# Patient Record
Sex: MALE | Race: WHITE | NOT HISPANIC OR LATINO | Employment: FULL TIME | URBAN - NONMETROPOLITAN AREA
[De-identification: names, ages, dates, MRNs, and addresses within clinical notes are randomized per-mention and may not be internally consistent; named-entity substitution may affect disease eponyms.]

---

## 2020-03-13 ENCOUNTER — APPOINTMENT (OUTPATIENT)
Dept: GENERAL RADIOLOGY | Facility: HOSPITAL | Age: 25
End: 2020-03-13

## 2020-03-13 ENCOUNTER — APPOINTMENT (OUTPATIENT)
Dept: CT IMAGING | Facility: HOSPITAL | Age: 25
End: 2020-03-13

## 2020-03-13 ENCOUNTER — HOSPITAL ENCOUNTER (EMERGENCY)
Facility: HOSPITAL | Age: 25
Discharge: HOME | End: 2020-03-13
Admitting: EMERGENCY MEDICINE

## 2020-03-13 VITALS
RESPIRATION RATE: 20 BRPM | HEART RATE: 90 BPM | TEMPERATURE: 98 F | DIASTOLIC BLOOD PRESSURE: 92 MMHG | HEIGHT: 74 IN | OXYGEN SATURATION: 97 % | WEIGHT: 315 LBS | SYSTOLIC BLOOD PRESSURE: 153 MMHG | BODY MASS INDEX: 40.43 KG/M2

## 2020-03-13 DIAGNOSIS — S62.112A CLOSED CHIP FRACTURE OF TRIQUETRUM OF LEFT WRIST, INITIAL ENCOUNTER: ICD-10-CM

## 2020-03-13 DIAGNOSIS — H10.9 CONJUNCTIVITIS OF RIGHT EYE, UNSPECIFIED CONJUNCTIVITIS TYPE: ICD-10-CM

## 2020-03-13 DIAGNOSIS — S52.502A CLOSED FRACTURE OF DISTAL END OF LEFT RADIUS, UNSPECIFIED FRACTURE MORPHOLOGY, INITIAL ENCOUNTER: ICD-10-CM
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
## 2020-03-13 PROCEDURE — 25010000002 ONDANSETRON PER 1 MG: Performed by: NURSE PRACTITIONER

## 2020-03-13 PROCEDURE — 72125 CT NECK SPINE W/O DYE: CPT

## 2020-03-13 PROCEDURE — 96372 THER/PROPH/DIAG INJ SC/IM: CPT

## 2020-03-13 PROCEDURE — 73090 X-RAY EXAM OF FOREARM: CPT

## 2020-03-13 PROCEDURE — 73110 X-RAY EXAM OF WRIST: CPT

## 2020-03-13 PROCEDURE — 25010000003 HYDROMORPHONE 1 MG/ML SOLUTION: Performed by: NURSE PRACTITIONER

## 2020-03-13 PROCEDURE — 99283 EMERGENCY DEPT VISIT LOW MDM: CPT

## 2020-03-13 PROCEDURE — 70450 CT HEAD/BRAIN W/O DYE: CPT

## 2020-03-13 PROCEDURE — 72131 CT LUMBAR SPINE W/O DYE: CPT

## 2020-03-13 RX ORDER — ONDANSETRON 2 MG/ML
4 INJECTION INTRAMUSCULAR; INTRAVENOUS ONCE
Status: COMPLETED | OUTPATIENT
Start: 2020-03-13 | End: 2020-03-13

## 2020-03-13 RX ORDER — HYDROCODONE BITARTRATE AND ACETAMINOPHEN 10; 325 MG/1; MG/1
1 TABLET ORAL EVERY 6 HOURS PRN
Qty: 12 TABLET | Refills: 0 | Status: SHIPPED | OUTPATIENT
Start: 2020-03-13 | End: 2020-03-16

## 2020-03-13 RX ORDER — NAPROXEN 500 MG/1
500 TABLET ORAL 2 TIMES DAILY WITH MEALS
Qty: 20 TABLET | Refills: 0 | Status: SHIPPED | OUTPATIENT
Start: 2020-03-13 | End: 2020-03-23

## 2020-03-13 RX ADMIN — ONDANSETRON HYDROCHLORIDE 4 MG: 2 SOLUTION INTRAMUSCULAR; INTRAVENOUS at 09:55

## 2020-03-13 RX ADMIN — HYDROMORPHONE HYDROCHLORIDE 1 MG: 1 INJECTION, SOLUTION INTRAMUSCULAR; INTRAVENOUS; SUBCUTANEOUS at 09:55

## 2020-03-13 NOTE — ED PROVIDER NOTES
Subjective   25 yom c/o fall.  He states he slipped missing a step and fell [id number] steps.  He is unsure of LOC.  He is c/o headache, neck pain and LFA pain.  He is alert and oriented x 3. No paralysis or incontinence. No tenderness to the spine.  He has mild swelling to the LFA.  He has a large hematoma to the L forehead.  He also has right eye redness, matting and yellow drainage. C-collar placed.          Review of Systems   Constitutional: Negative for activity change, appetite change, fatigue and fever.   HENT: Negative for congestion, ear pain, facial swelling and sore throat.    Eyes: Negative for discharge and visual disturbance.   Respiratory: Negative for apnea, chest tightness, shortness of breath, wheezing and stridor.    Cardiovascular: Negative for chest pain and palpitations.   Gastrointestinal: Negative for abdominal distention, abdominal pain, diarrhea, nausea and vomiting.   Genitourinary: Negative for difficulty urinating and dysuria.   Musculoskeletal: Positive for back pain and neck pain. Negative for arthralgias and myalgias.   Skin: Negative for rash and wound.   Neurological: Negative for dizziness and seizures.   Psychiatric/Behavioral: Negative for agitation and confusion.       History reviewed. No pertinent past medical history.    No Known Allergies    History reviewed. No pertinent surgical history.    No family history on file.    Social History     Socioeconomic History   • Marital status: Single     Spouse name: Not on file   • Number of children: Not on file   • Years of education: Not on file   • Highest education level: Not on file           Objective   Physical Exam   Constitutional: He is oriented to person, place, and time. He appears well-developed.   HENT:   Head: Normocephalic. Head is without raccoon's eyes and without Woodard's sign.       Large hematoma present to the left forehead   Eyes: Pupils are equal, round, and reactive to light. EOM are normal. Right eye exhibits  discharge.   Right conjunctival redness present   Neck: Normal range of motion. Neck supple.   Cardiovascular: Normal rate and regular rhythm.   No murmur heard.  Pulmonary/Chest: Effort normal and breath sounds normal.   Abdominal: Soft. Bowel sounds are normal.   Musculoskeletal: Normal range of motion.   Neurological: He is alert and oriented to person, place, and time.   Skin: Skin is warm and dry.   Psychiatric: He has a normal mood and affect.   Nursing note and vitals reviewed.      Splint - Cast - Strapping  Date/Time: 3/13/2020 10:30 AM  Performed by: Chris Aguirre APRN  Authorized by: Chris Aguirre APRN     Consent:     Consent obtained:  Verbal    Consent given by:  Patient    Risks discussed:  Discoloration, numbness, pain and swelling    Alternatives discussed:  No treatment  Pre-procedure details:     Sensation:  Normal    Skin color:  Pink  Procedure details:     Laterality:  Left    Location:  Wrist    Wrist:  L wrist    Splint type:  Sugar tong    Supplies:  Ortho-Glass  Post-procedure details:     Pain:  Improved    Sensation:  Normal    Skin color:  Pink    Patient tolerance of procedure:  Tolerated well, no immediate complications  Comments:      Placed per staff               ED Course  ED Course as of Mar 14 0805   Fri Mar 13, 2020   1025 I discussed the patient's testing results with him. He voiced understanding of results and discharge instructions.    [KS]   [address] [id number] reviewed and in order    [KS]      ED Course User Index  [KS] Chris Aguirre APRN                   No current facility-administered medications for this encounter.     Current Outpatient Medications:   •  HYDROcodone-acetaminophen (NORCO)  MG per tablet, Take 1 tablet by mouth Every 6 (Six) Hours As Needed for Moderate Pain  for up to 3 days., Disp: 12 tablet, Rfl: 0  •  naproxen (NAPROSYN) 500 MG tablet, Take 1 tablet by mouth 2 (Two) Times a Day With Meals for 10 days.,  "Disp: 20 tablet, Rfl: 0  •  tobramycin in sterile water (preservative free) injection-balanced salts ophthalmic solution, Apply 1-2 drops to eye(s) as directed by provider Every 4 (Four) Hours., Disp: 5 mL, Rfl: 0    Vital signs:  /92 (BP Location: Right arm, Patient Position: Sitting)   Pulse 90   Temp 98 °F (36.7 °C)   Resp 20   Ht 188 cm (74\")   Wt (!) 145 kg (320 lb)   SpO2 97%   BMI 41.09 kg/m²        ED LAB RESULTS:   Lab Results (last 24 hours)     ** No results found for the last 24 hours. **             IMAGING RESULTS  CT Head Without Contrast   ED Interpretation   See results below      Final Result   1. No intracranial hemorrhage, edema or mass effect.   2. Soft tissue hematoma over the left forehead.       The full report of this exam was immediately signed and available to the   emergency room. The patient is currently in the emergency room.       This report was finalized on 03/13/2020 09:22 by Dr. Blayne Ball MD.      CT Cervical Spine Without Contrast   ED Interpretation   See results below      Final Result   1.  No acute osseous posttraumatic findings.   This report was finalized on 03/13/2020 09:31 by Dr. Allyssa Prather MD.      CT Lumbar Spine Without Contrast   ED Interpretation   See results below      Final Result   1.  No acute osseous posttraumatic findings.           This report was finalized on 03/13/2020 09:33 by Dr. Allyssa Prather MD.      XR Wrist 3+ View Left   ED Interpretation   See results below      Final Result   1. Nondisplaced fracture of the mid to distal shaft of the radius.   2. Nondisplaced triquetral fracture.   3. Ulna minus variant. Corticated bone fragment adjacent to the distal   ulna is likely an old injury or unfused growth center.       The full report of this exam was immediately signed and available to the   emergency room. The patient is currently in the emergency room.       This report was finalized on 03/13/2020 08:54 by Dr. Blayne Ball, " MD.      XR Forearm 2 View Left   ED Interpretation   See results below      Final Result   1. Nondisplaced fracture of the distal shaft of the radius.   2. Nondisplaced fracture of the triquetrum seen better on the wrist   series.   3. Ulna minus variant. Corticated bone fragment adjacent to the ulnar   styloid process likely an old injury or unfused growth center.   This report was finalized on 03/13/2020 08:56 by Dr. Blayne Ball MD.                                              MDM  Number of Diagnoses or Management Options  Closed chip fracture of triquetrum of left wrist, initial encounter: minor  Closed fracture of distal end of left radius, unspecified fracture morphology, initial encounter: minor  Conjunctivitis of right eye, unspecified conjunctivitis type: minor  Fall, initial encounter: minor     Amount and/or Complexity of Data Reviewed  Tests in the radiology section of CPT®: ordered and reviewed  Discuss the patient with other providers: yes  Independent visualization of images, tracings, or specimens: yes    Patient Progress  Patient progress: improved      Final diagnoses:   Fall, initial encounter   Closed fracture of distal end of left radius, unspecified fracture morphology, initial encounter   Closed chip fracture of triquetrum of left wrist, initial encounter   Conjunctivitis of right eye, unspecified conjunctivitis type            Shoulders, Chris Caputo, APRN  03/14/20 0884

## 2020-03-13 NOTE — DISCHARGE INSTRUCTIONS
Increase fluids. Medication as ordered.  Can add tylenol as needed for pain/fever.  Splint in place until seen by orthopedics.  Ice to affected areas.  Follow up with PCP and orthopedics (Cyril on call) Monday - call for appointment. Return to ED if condition does not improve or worsens

## 2023-02-22 ENCOUNTER — PRIOR AUTHORIZATION (OUTPATIENT)
Dept: INTERNAL MEDICINE | Facility: CLINIC | Age: 28
End: 2023-02-22
Payer: COMMERCIAL

## 2023-02-22 ENCOUNTER — OFFICE VISIT (OUTPATIENT)
Dept: INTERNAL MEDICINE | Facility: CLINIC | Age: 28
End: 2023-02-22
Payer: COMMERCIAL

## 2023-02-22 VITALS
TEMPERATURE: 97.8 F | HEIGHT: 74 IN | OXYGEN SATURATION: 100 % | SYSTOLIC BLOOD PRESSURE: 144 MMHG | BODY MASS INDEX: 39.4 KG/M2 | HEART RATE: 94 BPM | WEIGHT: 307 LBS | DIASTOLIC BLOOD PRESSURE: 90 MMHG

## 2023-02-22 DIAGNOSIS — E11.65 TYPE 2 DIABETES MELLITUS WITH HYPERGLYCEMIA, WITHOUT LONG-TERM CURRENT USE OF INSULIN: Primary | ICD-10-CM

## 2023-02-22 DIAGNOSIS — Z00.00 HEALTH CARE MAINTENANCE: ICD-10-CM

## 2023-02-22 PROBLEM — F90.9 ADULT ATTENTION DEFICIT HYPERACTIVITY DISORDER: Status: ACTIVE | Noted: 2023-02-22

## 2023-02-22 LAB
EXPIRATION DATE: ABNORMAL
HBA1C MFR BLD: 10 %
Lab: ABNORMAL

## 2023-02-22 PROCEDURE — 99204 OFFICE O/P NEW MOD 45 MIN: CPT | Performed by: INTERNAL MEDICINE

## 2023-02-22 PROCEDURE — 83036 HEMOGLOBIN GLYCOSYLATED A1C: CPT | Performed by: INTERNAL MEDICINE

## 2023-02-22 NOTE — TELEPHONE ENCOUNTER
Ozempic (1 MG/DOSE) 4MG/3ML pen-injectors    (Key: [id number])  Rx #: [id number]    Message from Plan  This request has been approved using information available on the patient's profile. CaseId:[id number];Status:Approved;Review Type:Prior Auth;Coverage Start Date:01/23/2023;Coverage End Date:02/22/2024;

## 2023-02-22 NOTE — PROGRESS NOTES
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 Uncertain of his glucose numbers\"  This was about a year ago.   He dose not check his glucose on a regular basis.     He states that he stopped Metformin because it wasn't doing anything.     No HX of thyroid tumor or pancreatitis.     Patient's PMR from outside medical facility reviewed and noted.    Review of Systems   Constitutional: Negative for chills and fever.   HENT: Negative for congestion and rhinorrhea.    Respiratory: Negative for cough and shortness of breath.    Cardiovascular: Negative for chest pain and leg swelling.   Gastrointestinal: Negative for constipation and diarrhea.   Genitourinary: Negative for dysuria and hematuria.      Otherwise complete ROS reviewed and negative except as mentioned in the HPI.    Past Medical History:   Past Medical History:   Diagnosis Date   • Diabetes mellitus (HCC)      Past Surgical History:  Past Surgical History:   Procedure Laterality Date   • FRACTURE SURGERY       Social History:  reports that he has never smoked. He has never used smokeless tobacco. He reports that he does not drink alcohol and does not use drugs.    Family History: family history includes Cancer in his brother and maternal grandfather; Diabetes in his father.       Allergies:  No Known Allergies  Medications:  Prior to Admission medications    Medication Sig Start Date End Date Taking? Authorizing Provider   tobramycin in sterile water (preservative free) injection-balanced salts ophthalmic solution Apply 1-2 drops to eye(s) as directed by provider Every 4 (Four) Hours. 3/13/20 2/22/23  Shoulders, Kristee Croft, APRN       Objective     Vital Signs: /90 (BP Location: Left arm, Patient Position: Sitting, Cuff Size: Large Adult)   Pulse 94   Temp 97.8 °F (36.6 °C) (Infrared)   Ht 188 cm (74\")   Wt (!) 139 kg (307 lb)   SpO2 " 100%   BMI 39.42 kg/m²   Physical Exam  Vitals reviewed.   Constitutional:       Appearance: He is obese.   HENT:      Head: Normocephalic and atraumatic.      Right Ear: External ear normal.      Left Ear: External ear normal.      Nose: Nose normal.   Eyes:      General: No scleral icterus.     Conjunctiva/sclera: Conjunctivae normal.   Cardiovascular:      Rate and Rhythm: Normal rate and regular rhythm.      Heart sounds: Normal heart sounds.   Pulmonary:      Effort: Pulmonary effort is normal.      Breath sounds: Normal breath sounds.   Musculoskeletal:         General: No swelling or tenderness.      Cervical back: Normal range of motion and neck supple.   Skin:     General: Skin is warm and dry.   Neurological:      General: No focal deficit present.      Mental Status: He is alert.      Cranial Nerves: No cranial nerve deficit.   Psychiatric:         Mood and Affect: Mood normal.         Behavior: Behavior normal.         Class 2 Severe Obesity (BMI >=35 and <=39.9). Obesity-related health conditions include the following: diabetes mellitus. Obesity is newly identified. BMI is is above average; BMI management plan is completed. We discussed portion control and increasing exercise.      Results Reviewed:  No results found for: GLUCOSE, BUN, CREATININE, NA, K, CL, CO2, CALCIUM, ALT, AST, WBC, HCT, PLT, CHOL, TRIG, HDL, LDL, LDLHDL, HGBA1C    Assessment / Plan     Assessment/Plan:  1. Type 2 diabetes mellitus with hyperglycemia, without long-term current use of insulin ([facility])  - POC Glycosylated Hemoglobin (Hb A1C)  - Semaglutide, 1 MG/DOSE, 4 MG/3ML solution pen-injector; Inject 1 mg under the skin into the appropriate area as directed 1 (One) Time Per Week.  Dispense: 3 mL; Refill: 1    2. Health care maintenance  - CBC w AUTO Differential  - Comprehensive metabolic panel  - Lipid panel  - Testosterone  - T4  - TSH        Return in about 4 weeks (around 3/22/2023) for Recheck, Next scheduled follow up.  unless patient needs to be seen sooner or acute issues arise.    Code Status: Full    I have discussed the patient results/orders and and plan/recommendation with them at today's visit.      Neil Krueger, DO   02/22/2023

## 2023-02-23 ENCOUNTER — PATIENT ROUNDING (BHMG ONLY) (OUTPATIENT)
Dept: INTERNAL MEDICINE | Facility: CLINIC | Age: 28
End: 2023-02-23
Payer: COMMERCIAL

## 2023-02-23 LAB
ALBUMIN SERPL-MCNC: 4.7 G/DL (ref 4.1–5.2)
ALBUMIN/GLOB SERPL: 1.7 {RATIO} (ref 1.2–2.2)
ALP SERPL-CCNC: 82 IU/L (ref 44–121)
ALT SERPL-CCNC: 19 IU/L (ref 0–44)
AST SERPL-CCNC: 16 IU/L (ref 0–40)
BASOPHILS # BLD AUTO: 0 X10E3/UL (ref 0–0.2)
BASOPHILS NFR BLD AUTO: 1 %
BILIRUB SERPL-MCNC: 0.5 MG/DL (ref 0–1.2)
BUN SERPL-MCNC: 10 MG/DL (ref 6–20)
BUN/CREAT SERPL: 14 (ref 9–20)
CALCIUM SERPL-MCNC: 9.4 MG/DL (ref 8.7–10.2)
CHLORIDE SERPL-SCNC: 96 MMOL/L (ref 96–106)
CHOLEST SERPL-MCNC: 199 MG/DL (ref 100–199)
CO2 SERPL-SCNC: 21 MMOL/L (ref 20–29)
CREAT SERPL-MCNC: 0.74 MG/DL (ref 0.76–1.27)
EGFRCR SERPLBLD CKD-EPI 2021: 127 ML/MIN/1.73
EOSINOPHIL # BLD AUTO: 0.1 X10E3/UL (ref 0–0.4)
EOSINOPHIL NFR BLD AUTO: 1 %
ERYTHROCYTE [DISTWIDTH] IN BLOOD BY AUTOMATED COUNT: 12.9 % (ref 11.6–15.4)
GLOBULIN SER CALC-MCNC: 2.7 G/DL (ref 1.5–4.5)
GLUCOSE SERPL-MCNC: 265 MG/DL (ref 70–99)
HCT VFR BLD AUTO: 49.3 % (ref 37.5–51)
HDLC SERPL-MCNC: 29 MG/DL
HGB BLD-MCNC: 16.6 G/DL (ref 13–17.7)
IMM GRANULOCYTES # BLD AUTO: 0.1 X10E3/UL (ref 0–0.1)
IMM GRANULOCYTES NFR BLD AUTO: 1 %
LDLC SERPL CALC-MCNC: 54 MG/DL (ref 0–99)
LYMPHOCYTES # BLD AUTO: 1.9 X10E3/UL (ref 0.7–3.1)
LYMPHOCYTES NFR BLD AUTO: 27 %
MCH RBC QN AUTO: 30.2 PG (ref 26.6–33)
MCHC RBC AUTO-ENTMCNC: 33.7 G/DL (ref 31.5–35.7)
MCV RBC AUTO: 90 FL (ref 79–97)
MONOCYTES # BLD AUTO: 0.4 X10E3/UL (ref 0.1–0.9)
MONOCYTES NFR BLD AUTO: 6 %
NEUTROPHILS # BLD AUTO: 4.7 X10E3/UL (ref 1.4–7)
NEUTROPHILS NFR BLD AUTO: 64 %
PLATELET # BLD AUTO: 225 X10E3/UL (ref 150–450)
POTASSIUM SERPL-SCNC: 4.7 MMOL/L (ref 3.5–5.2)
PROT SERPL-MCNC: 7.4 G/DL (ref 6–8.5)
RBC # BLD AUTO: 5.5 X10E6/UL (ref 4.14–5.8)
SODIUM SERPL-SCNC: 134 MMOL/L (ref 134–144)
T4 SERPL-MCNC: 8.9 UG/DL (ref 4.5–12)
TESTOST SERPL-MCNC: 209 NG/DL (ref 264–916)
TRIGL SERPL-MCNC: 790 MG/DL (ref 0–149)
TSH SERPL DL<=0.005 MIU/L-ACNC: 2.01 UIU/ML (ref 0.45–4.5)
VLDLC SERPL CALC-MCNC: 116 MG/DL (ref 5–40)
WBC # BLD AUTO: 7.1 X10E3/UL (ref 3.4–10.8)

## 2023-02-23 NOTE — PROGRESS NOTES
February 23, 2023    Hello, may I speak with Nick Aguirre?    My name is HOLLY      I am  with [facility]  [address] [place] [address] [phone].    Before we get started may I verify your date of birth? 1995    I am calling to officially welcome you to our practice and ask about your recent visit. Is this a good time to talk? yes    Tell me about your visit with us. What things went well?  PT stated he really liked Dr Krueger, he was very pleased!  She cared in what she was saying.       We're always looking for ways to make our patients' experiences even better. Do you have recommendations on ways we may improve?  no    Overall were you satisfied with your first visit to our practice? yes       I appreciate you taking the time to speak with me today. Is there anything else I can do for you? no      Thank you, and have a great day.

## 2023-03-06 DIAGNOSIS — E78.1 HYPERTRIGLYCERIDEMIA: Primary | ICD-10-CM

## 2023-03-08 ENCOUNTER — LAB (OUTPATIENT)
Dept: INTERNAL MEDICINE | Facility: CLINIC | Age: 28
End: 2023-03-08
Payer: COMMERCIAL

## 2023-03-20 ENCOUNTER — OFFICE VISIT (OUTPATIENT)
Dept: INTERNAL MEDICINE | Facility: CLINIC | Age: 28
End: 2023-03-20
Payer: COMMERCIAL

## 2023-03-20 VITALS
SYSTOLIC BLOOD PRESSURE: 147 MMHG | TEMPERATURE: 98.2 F | HEIGHT: 74 IN | OXYGEN SATURATION: 99 % | WEIGHT: 307 LBS | HEART RATE: 85 BPM | DIASTOLIC BLOOD PRESSURE: 92 MMHG | BODY MASS INDEX: 39.4 KG/M2

## 2023-03-20 DIAGNOSIS — I10 PRIMARY HYPERTENSION: ICD-10-CM

## 2023-03-20 DIAGNOSIS — R79.89 LOW TESTOSTERONE: ICD-10-CM

## 2023-03-20 DIAGNOSIS — E78.5 DYSLIPIDEMIA: Primary | ICD-10-CM

## 2023-03-20 PROCEDURE — 99214 OFFICE O/P EST MOD 30 MIN: CPT | Performed by: INTERNAL MEDICINE

## 2023-03-20 RX ORDER — LISINOPRIL 10 MG/1
10 TABLET ORAL DAILY
Qty: 90 TABLET | Refills: 1 | Status: SHIPPED | OUTPATIENT
Start: 2023-03-20

## 2023-03-20 RX ORDER — FENOFIBRATE 145 MG/1
145 TABLET, COATED ORAL DAILY
Qty: 90 TABLET | Refills: 1 | Status: SHIPPED | OUTPATIENT
Start: 2023-03-20

## 2023-03-20 RX ORDER — TESTOSTERONE CYPIONATE 200 MG/ML
200 INJECTION, SOLUTION INTRAMUSCULAR WEEKLY
Qty: 4 ML | Refills: 2 | Status: SHIPPED | OUTPATIENT
Start: 2023-03-20

## 2023-03-20 NOTE — PROGRESS NOTES
Subjective     Chief Complaint   Patient presents with   • Diabetes     4 wk fu         History of Present Illness  Patient presents for 1 month follow up after starting Ozempic. Patient is up to 0.5mg starting last night. Patient does not monitor glucose regularly states last time he checked was 2-3 months ago and his non fasting was ~270. Patient states that he is physically active at work daily and that he has been trying to cut out sugar, specifically soft drinks and sweet tea. Patient states he will occasionally drink a Pibb but other than that he has been staying away from sweetened drinks. Patient states he will put sweet n low in unsweetened tea. Patient states he has been having increased depressed mood which he thinks is due to relationship issues within the past year.    Labs reviewed. Patient willing to take medications.     Patient's PMR from outside medical facility reviewed and noted.    Review of Systems   Constitutional: Negative for chills, fatigue and fever.   HENT: Positive for congestion (seasonal), rhinorrhea and sinus pressure.    Eyes: Positive for redness.   Respiratory: Negative for chest tightness and shortness of breath.    Cardiovascular: Negative for chest pain and leg swelling.   Gastrointestinal: Negative for abdominal pain, nausea and vomiting.   Genitourinary: Negative for difficulty urinating and dysuria.   Musculoskeletal: Negative for back pain and neck pain.   Skin: Negative for rash.   Neurological: Negative for dizziness and light-headedness.   Psychiatric/Behavioral: Negative for behavioral problems and suicidal ideas.      Otherwise complete ROS reviewed and negative except as mentioned in the HPI.    Past Medical History:   Past Medical History:   Diagnosis Date   • Diabetes mellitus (HCC)      Past Surgical History:  Past Surgical History:   Procedure Laterality Date   • FRACTURE SURGERY       Social History:  reports that he has never smoked. He has never used  "smokeless tobacco. He reports that he does not drink alcohol and does not use drugs.    Family History: family history includes Cancer in his brother and maternal grandfather; Diabetes in his father.      Allergies:  No Known Allergies  Medications:  Prior to Admission medications    Medication Sig Start Date End Date Taking? Authorizing Provider   Semaglutide, 1 MG/DOSE, 4 MG/3ML solution pen-injector Inject 1 mg under the skin into the appropriate area as directed 1 (One) Time Per Week. 2/22/23  Yes Neil Krueger,        Objective     Vital Signs: /92 (BP Location: Left arm, Patient Position: Sitting, Cuff Size: Large Adult)   Pulse 85   Temp 98.2 °F (36.8 °C) (Infrared)   Ht 188 cm (74\")   Wt (!) 139 kg (307 lb)   SpO2 99%   BMI 39.42 kg/m²   Physical Exam  Vitals reviewed.   Constitutional:       Appearance: Normal appearance.   HENT:      Head: Normocephalic and atraumatic.      Right Ear: External ear normal.      Left Ear: External ear normal.      Nose: Nose normal.   Eyes:      General: No scleral icterus.     Conjunctiva/sclera: Conjunctivae normal.   Cardiovascular:      Rate and Rhythm: Normal rate and regular rhythm.      Heart sounds: Normal heart sounds.   Pulmonary:      Effort: Pulmonary effort is normal.      Breath sounds: Normal breath sounds.   Musculoskeletal:         General: No swelling or tenderness.      Cervical back: Normal range of motion and neck supple.   Skin:     General: Skin is warm and dry.   Neurological:      Mental Status: He is alert and oriented to person, place, and time.      Cranial Nerves: No cranial nerve deficit.   Psychiatric:         Mood and Affect: Mood normal.         Behavior: Behavior normal.       Class 2 Severe Obesity (BMI >=35 and <=39.9). Obesity-related health conditions include the following: hypertension. Obesity is unchanged. BMI is is above average; BMI management plan is completed. We discussed portion control and increasing " exercise.      Results Reviewed:  Glucose   Date Value Ref Range Status   02/22/2023 265 (H) 70 - 99 mg/dL Final     BUN   Date Value Ref Range Status   02/22/2023 10 6 - 20 mg/dL Final     Creatinine   Date Value Ref Range Status   02/22/2023 0.74 (L) 0.76 - 1.27 mg/dL Final     Sodium   Date Value Ref Range Status   02/22/2023 134 134 - 144 mmol/L Final     Potassium   Date Value Ref Range Status   02/22/2023 4.7 3.5 - 5.2 mmol/L Final     Chloride   Date Value Ref Range Status   02/22/2023 96 96 - 106 mmol/L Final     Total CO2   Date Value Ref Range Status   02/22/2023 21 20 - 29 mmol/L Final     Calcium   Date Value Ref Range Status   02/22/2023 9.4 8.7 - 10.2 mg/dL Final     ALT (SGPT)   Date Value Ref Range Status   02/22/2023 19 0 - 44 IU/L Final     AST (SGOT)   Date Value Ref Range Status   02/22/2023 16 0 - 40 IU/L Final     WBC   Date Value Ref Range Status   02/22/2023 7.1 3.4 - 10.8 x10E3/uL Final     Hematocrit   Date Value Ref Range Status   02/22/2023 49.3 37.5 - 51.0 % Final     Platelets   Date Value Ref Range Status   02/22/2023 225 150 - 450 x10E3/uL Final     Triglycerides   Date Value Ref Range Status   03/08/2023 556 (H) 0 - 149 mg/dL Final     HDL Cholesterol   Date Value Ref Range Status   03/08/2023 31 (L) >39 mg/dL Final     LDL Chol Calc (NIH)   Date Value Ref Range Status   03/08/2023 58 0 - 99 mg/dL Final     Hemoglobin A1C   Date Value Ref Range Status   02/22/2023 10.0 % Final         Assessment / Plan     Assessment/Plan:  1. Dyslipidemia  - fenofibrate (Tricor) 145 MG tablet; Take 1 tablet by mouth Daily.  Dispense: 90 tablet; Refill: 1    2. Low testosterone  - Testosterone Cypionate (Depo-Testosterone) 200 MG/ML injection; Inject 1 mL into the appropriate muscle as directed by prescriber 1 (One) Time Per Week.  Dispense: 4 mL; Refill: 2    3. Primary hypertension  - lisinopril (PRINIVIL,ZESTRIL) 10 MG tablet; Take 1 tablet by mouth Daily.  Dispense: 90 tablet; Refill:  1        Return in about 3 months (around 6/20/2023) for Recheck, Next scheduled follow up. unless patient needs to be seen sooner or acute issues arise.    Code Status: Full    I have discussed the patient results/orders and and plan/recommendation with them at today's visit.      Neil Krueger, DO   03/20/2023

## 2023-04-25 DIAGNOSIS — E11.65 TYPE 2 DIABETES MELLITUS WITH HYPERGLYCEMIA, WITHOUT LONG-TERM CURRENT USE OF INSULIN: ICD-10-CM

## 2023-04-25 NOTE — TELEPHONE ENCOUNTER
Rx Refill Note  Requested Prescriptions     Pending Prescriptions Disp Refills   • Semaglutide, 1 MG/DOSE, (OZEMPIC) 4 MG/3ML solution pen-injector 3 mL 1     Sig: Inject 1 mg under the skin into the appropriate area as directed 1 (One) Time Per Week.      Last office visit with prescribing clinician: 3/20/2023   Last telemedicine visit with prescribing clinician: 7/10/2023   Next office visit with prescribing clinician: 7/10/2023                         Would you like a call back once the refill request has been completed: [] Yes [] No    If the office needs to give you a call back, can they leave a voicemail: [] Yes [] No    Kecia Degroot MA  04/25/23, 16:10 CDT

## 2023-04-25 NOTE — TELEPHONE ENCOUNTER
"  Caller: Nick Aguirre \"WILL\"    Relationship: Self    Best call back number: [phone]  Requested Prescriptions:   Requested Prescriptions     Pending Prescriptions Disp Refills   • Semaglutide, 1 MG/DOSE, (OZEMPIC) 4 MG/3ML solution pen-injector 3 mL 1     Sig: Inject 1 mg under the skin into the appropriate area as directed 1 (One) Time Per Week.        Pharmacy where request should be sent: [facility] #[id number] - [place], [place] - [address] [phone] [facility] [phone]      Last office visit with prescribing clinician: 3/20/2023   Last telemedicine visit with prescribing clinician: 7/10/2023   Next office visit with prescribing clinician: 7/10/2023         Does the patient have less than a 3 day supply:  [x] Yes  [] No    Would you like a call back once the refill request has been completed: [] Yes [x] No    I    Omkar Martinez Rep   04/25/23 16:10 CDT             "

## 2023-06-16 DIAGNOSIS — E11.65 TYPE 2 DIABETES MELLITUS WITH HYPERGLYCEMIA, WITHOUT LONG-TERM CURRENT USE OF INSULIN: ICD-10-CM

## 2023-06-16 NOTE — TELEPHONE ENCOUNTER
Rx Refill Note  Requested Prescriptions     Pending Prescriptions Disp Refills    Semaglutide, 1 MG/DOSE, (OZEMPIC) 4 MG/3ML solution pen-injector 3 mL 1     Sig: Inject 1 mg under the skin into the appropriate area as directed 1 (One) Time Per Week.      Last office visit with prescribing clinician: 3/20/2023   Last telemedicine visit with prescribing clinician: Visit date not found   Next office visit with prescribing clinician: Visit date not found                         Would you like a call back once the refill request has been completed: [] Yes [] No    If the office needs to give you a call back, can they leave a voicemail: [] Yes [] No    Kecia Degroot MA  06/16/23, 15:43 CDT

## 2023-08-21 DIAGNOSIS — E11.65 TYPE 2 DIABETES MELLITUS WITH HYPERGLYCEMIA, WITHOUT LONG-TERM CURRENT USE OF INSULIN: ICD-10-CM

## 2023-10-29 DIAGNOSIS — E78.5 DYSLIPIDEMIA: ICD-10-CM

## 2023-10-29 DIAGNOSIS — E11.65 TYPE 2 DIABETES MELLITUS WITH HYPERGLYCEMIA, WITHOUT LONG-TERM CURRENT USE OF INSULIN: ICD-10-CM

## 2023-10-29 DIAGNOSIS — I10 PRIMARY HYPERTENSION: ICD-10-CM

## 2023-10-30 RX ORDER — LISINOPRIL 20 MG/1
20 TABLET ORAL DAILY
Qty: 90 TABLET | Refills: 1 | Status: SHIPPED | OUTPATIENT
Start: 2023-10-30

## 2023-10-30 NOTE — TELEPHONE ENCOUNTER
Rx Refill Note  Requested Prescriptions     Pending Prescriptions Disp Refills    fenofibrate (Tricor) 145 MG tablet 90 tablet 1     Sig: Take 1 tablet by mouth Daily.    Semaglutide, 1 MG/DOSE, (OZEMPIC) 4 MG/3ML solution pen-injector 3 mL 1     Sig: Inject 1 mg under the skin into the appropriate area as directed 1 (One) Time Per Week.      Last office visit with prescribing clinician: 7/10/2023  Next office visit with prescribing clinician: 10/29/2023                         Would you like a call back once the refill request has been completed: [] Yes [] No    If the office needs to give you a call back, can they leave a voicemail: [] Yes [] No    Lisa Jiang RN  10/30/23, 09:48 CDT

## 2023-10-31 RX ORDER — FENOFIBRATE 145 MG/1
145 TABLET, COATED ORAL DAILY
Qty: 90 TABLET | Refills: 1 | Status: SHIPPED | OUTPATIENT
Start: 2023-10-31

## 2023-11-30 DIAGNOSIS — E11.65 TYPE 2 DIABETES MELLITUS WITH HYPERGLYCEMIA, WITHOUT LONG-TERM CURRENT USE OF INSULIN: ICD-10-CM

## 2023-11-30 DIAGNOSIS — E78.5 DYSLIPIDEMIA: ICD-10-CM

## 2023-11-30 DIAGNOSIS — I10 PRIMARY HYPERTENSION: ICD-10-CM

## 2023-12-01 RX ORDER — LISINOPRIL 20 MG/1
20 TABLET ORAL DAILY
Qty: 90 TABLET | Refills: 1 | Status: SHIPPED | OUTPATIENT
Start: 2023-12-01

## 2023-12-01 RX ORDER — FENOFIBRATE 145 MG/1
145 TABLET, COATED ORAL DAILY
Qty: 90 TABLET | Refills: 1 | Status: SHIPPED | OUTPATIENT
Start: 2023-12-01

## 2023-12-01 NOTE — TELEPHONE ENCOUNTER
Rx Refill Note  Requested Prescriptions     Pending Prescriptions Disp Refills    fenofibrate (Tricor) 145 MG tablet 90 tablet 1     Sig: Take 1 tablet by mouth Daily.    Semaglutide, 1 MG/DOSE, (OZEMPIC) 4 MG/3ML solution pen-injector 3 mL 1     Sig: Inject 1 mg under the skin into the appropriate area as directed 1 (One) Time Per Week.    lisinopril (PRINIVIL,ZESTRIL) 20 MG tablet 90 tablet 1     Sig: Take 1 tablet by mouth Daily.      Last office visit with prescribing clinician: 7/10/2023   Last telemedicine visit with prescribing clinician: Visit date not found   Next office visit with prescribing clinician: Visit date not found                         Would you like a call back once the refill request has been completed: [] Yes [] No    If the office needs to give you a call back, can they leave a voicemail: [] Yes [] No    Lisa Jiang RN  12/01/23, 08:19 CST

## 2023-12-06 ENCOUNTER — TELEPHONE (OUTPATIENT)
Dept: INTERNAL MEDICINE | Facility: CLINIC | Age: 28
End: 2023-12-06
Payer: COMMERCIAL

## 2023-12-06 DIAGNOSIS — E11.65 TYPE 2 DIABETES MELLITUS WITH HYPERGLYCEMIA, WITHOUT LONG-TERM CURRENT USE OF INSULIN: Primary | ICD-10-CM

## 2023-12-06 RX ORDER — ORAL SEMAGLUTIDE 3 MG/1
3 TABLET ORAL EVERY MORNING
Qty: 30 TABLET | Refills: 0 | Status: SHIPPED | OUTPATIENT
Start: 2023-12-06

## 2023-12-06 NOTE — TELEPHONE ENCOUNTER
"  Caller: Nick Aguirre \"WILL\"    Relationship: Self    Best call back number: [phone]     What is the best time to reach you: ANYTIME    Who are you requesting to speak with (clinical staff, provider,  specific staff member): CLINICAL    What was the call regarding: PATIENT STATES HIS PHARMACY DOES NOT HAVE HIS OZEMPIC IN STOCK. HE CALLED AROUND TO OTHER PHARMAC[facility] AND [facility] [place] [facility] THE [facility] DOSE. HE IS WONDERING IF THIS IS OKAY FOR HIM TO TAKE. IF SO, CAN A PRESCRIPTION BE CALLED IN. HE STATES IT HAS BEEN A MONTH AND A HALF SINCE HE HAS HAD IT AND HE FEELS LIKE HE NEEDS IT. PLEASE ADVISE.     Is it okay if the provider responds through zappithart: YES          "

## 2023-12-07 ENCOUNTER — PRIOR AUTHORIZATION (OUTPATIENT)
Dept: INTERNAL MEDICINE | Facility: CLINIC | Age: 28
End: 2023-12-07
Payer: COMMERCIAL

## 2023-12-07 NOTE — TELEPHONE ENCOUNTER
Information regarding your request  This request has been approved using information available on the patient's profile. CaseId:[id number];Status:Approved;Review Type:Prior Auth;Coverage Start Date:11/07/2023;Coverage End Date:12/06/2024;

## 2024-02-23 DIAGNOSIS — E78.5 DYSLIPIDEMIA: ICD-10-CM

## 2024-02-23 DIAGNOSIS — E11.65 TYPE 2 DIABETES MELLITUS WITH HYPERGLYCEMIA, WITHOUT LONG-TERM CURRENT USE OF INSULIN: ICD-10-CM

## 2024-02-23 DIAGNOSIS — I10 PRIMARY HYPERTENSION: ICD-10-CM

## 2024-02-23 RX ORDER — ORAL SEMAGLUTIDE 3 MG/1
3 TABLET ORAL EVERY MORNING
Qty: 30 TABLET | Refills: 0 | Status: SHIPPED | OUTPATIENT
Start: 2024-02-23

## 2024-02-23 RX ORDER — LISINOPRIL 20 MG/1
20 TABLET ORAL DAILY
Qty: 90 TABLET | Refills: 1 | Status: SHIPPED | OUTPATIENT
Start: 2024-02-23

## 2024-02-23 RX ORDER — FENOFIBRATE 145 MG/1
145 TABLET, COATED ORAL DAILY
Qty: 90 TABLET | Refills: 1 | Status: SHIPPED | OUTPATIENT
Start: 2024-02-23

## 2024-02-23 NOTE — TELEPHONE ENCOUNTER
Rx Refill Note  Requested Prescriptions     Pending Prescriptions Disp Refills    fenofibrate (Tricor) 145 MG tablet 90 tablet 1     Sig: Take 1 tablet by mouth Daily.    lisinopril (PRINIVIL,ZESTRIL) 20 MG tablet 90 tablet 1     Sig: Take 1 tablet by mouth Daily.    Semaglutide (Rybelsus) 3 MG tablet 30 tablet 0     Sig: Take 1 tablet by mouth Every Morning.      Last office visit with prescribing clinician: 7/10/2023   Last telemedicine visit with prescribing clinician: Visit date not found   Next office visit with prescribing clinician: 2/28/2024                         Would you like a call back once the refill request has been completed: [] Yes [] No    If the office needs to give you a call back, can they leave a voicemail: [] Yes [] No    Lisa Jiang RN  02/23/24, 09:32 CST

## 2024-02-28 ENCOUNTER — OFFICE VISIT (OUTPATIENT)
Dept: INTERNAL MEDICINE | Facility: CLINIC | Age: 29
End: 2024-02-28
Payer: COMMERCIAL

## 2024-02-28 ENCOUNTER — TELEPHONE (OUTPATIENT)
Dept: PODIATRY | Facility: CLINIC | Age: 29
End: 2024-02-28
Payer: COMMERCIAL

## 2024-02-28 VITALS
TEMPERATURE: 98 F | DIASTOLIC BLOOD PRESSURE: 84 MMHG | BODY MASS INDEX: 39.32 KG/M2 | HEIGHT: 74 IN | RESPIRATION RATE: 16 BRPM | HEART RATE: 81 BPM | WEIGHT: 306.4 LBS | OXYGEN SATURATION: 97 % | SYSTOLIC BLOOD PRESSURE: 139 MMHG

## 2024-02-28 DIAGNOSIS — E78.1 HYPERTRIGLYCERIDEMIA: ICD-10-CM

## 2024-02-28 DIAGNOSIS — I10 PRIMARY HYPERTENSION: ICD-10-CM

## 2024-02-28 DIAGNOSIS — E11.65 TYPE 2 DIABETES MELLITUS WITH HYPERGLYCEMIA, WITHOUT LONG-TERM CURRENT USE OF INSULIN: ICD-10-CM

## 2024-02-28 DIAGNOSIS — Z00.00 ENCOUNTER FOR ANNUAL PHYSICAL EXAM: Primary | ICD-10-CM

## 2024-02-28 DIAGNOSIS — L60.0 INGROWN RIGHT BIG TOENAIL: ICD-10-CM

## 2024-02-28 DIAGNOSIS — E78.5 DYSLIPIDEMIA: ICD-10-CM

## 2024-02-28 DIAGNOSIS — Z11.59 ENCOUNTER FOR HEPATITIS C SCREENING TEST FOR LOW RISK PATIENT: ICD-10-CM

## 2024-02-28 RX ORDER — GINSENG 100 MG
1 CAPSULE ORAL 2 TIMES DAILY
Qty: 14.2 G | Refills: 0 | Status: SHIPPED | OUTPATIENT
Start: 2024-02-28

## 2024-02-28 NOTE — TELEPHONE ENCOUNTER
Called patient to see about getting him scheduled from his referral. I had to leave a message. He is wanting to be scheduled in [place] but the first opening isn't until may.

## 2024-02-28 NOTE — PROGRESS NOTES
"Chief Complaint  Annual Exam    Subjective        Nick Aguirre presents to [facility]  History of Present Illness  Patient is a 29-year-old male presenting today for annual examination.   He has not been checking his blood glucose. Does watch diet and has been trying to lose some weight. He was using ozempic but was having issues with getting this filled. Switched to Rybelsus with reports of less side effects.   Has history of elevated triglycerides. Taking fenofibrate.  Occasionally checks his blood pressure with it running better than it had in the past. Taking lisinopril.     Past Medical History:   Diagnosis Date    Diabetes mellitus      Past Surgical History:   Procedure Laterality Date    FRACTURE SURGERY       Social History     Socioeconomic History    Marital status: Single   Tobacco Use    Smoking status: Never     Passive exposure: Never    Smokeless tobacco: Never   Vaping Use    Vaping status: Never Used   Substance and Sexual Activity    Alcohol use: Never    Drug use: Never    Sexual activity: Never     Family History   Problem Relation Age of Onset    Diabetes Father     Cancer Maternal Grandfather     Cancer Brother        Review of Systems  Review of systems is negative unless otherwise specified in HPI.    Objective   Vital Signs:  /84 (BP Location: Left arm, Patient Position: Sitting, Cuff Size: Large Adult)   Pulse 81   Temp 98 °F (36.7 °C) (Infrared)   Resp 16   Ht 188 cm (74\")   Wt (!) 139 kg (306 lb 6.4 oz)   SpO2 97%   BMI 39.34 kg/m²   Estimated body mass index is 39.34 kg/m² as calculated from the following:    Height as of this encounter: 188 cm (74\").    Weight as of this encounter: 139 kg (306 lb 6.4 oz).       Class 2 Severe Obesity (BMI >=35 and <=39.9). Obesity-related health conditions include the following: hypertension, diabetes mellitus, and dyslipidemias. Obesity is improving with lifestyle modifications. BMI is is above average; " BMI management plan is completed. We discussed low calorie, low carb based diet program, portion control, and increasing exercise.       PHQ-9 Depression Screening  Little interest or pleasure in doing things? 0-->not at all   Feeling down, depressed, or hopeless? 0-->not at all   Trouble falling or staying asleep, or sleeping too much?     Feeling tired or having little energy?     Poor appetite or overeating?     Feeling bad about yourself - or that you are a failure or have let yourself or your family down?     Trouble concentrating on things, such as reading the newspaper or watching television?     Moving or speaking so slowly that other people could have noticed? Or the opposite - being so fidgety or restless that you have been moving around a lot more than usual?     Thoughts that you would be better off dead, or of hurting yourself in some way?     PHQ-9 Total Score 0   If you checked off any problems, how difficult have these problems made it for you to do your work, take care of things at home, or get along with other people?            Physical Exam  Vitals and nursing note reviewed.   Constitutional:       General: He is not in acute distress.     Appearance: He is obese. He is not ill-appearing.   HENT:      Head: Normocephalic.      Right Ear: Tympanic membrane normal.      Left Ear: Tympanic membrane normal.      Nose: Nose normal.      Mouth/Throat:      Mouth: Mucous membranes are moist.      Pharynx: Oropharynx is clear.   Eyes:      Pupils: Pupils are equal, round, and reactive to light.   Cardiovascular:      Rate and Rhythm: Normal rate and regular rhythm.      Pulses: Normal pulses.           Dorsalis pedis pulses are 2+ on the right side and 2+ on the left side.        Posterior tibial pulses are 2+ on the right side and 2+ on the left side.      Heart sounds: Normal heart sounds.   Pulmonary:      Effort: Pulmonary effort is normal. No respiratory distress.      Breath sounds: Normal breath  sounds.   Abdominal:      General: Bowel sounds are normal.      Palpations: Abdomen is soft.   Musculoskeletal:         General: Normal range of motion.      Cervical back: Normal range of motion.   Feet:      Right foot:      Protective Sensation: 10 sites tested.  7 sites sensed.      Skin integrity: Skin integrity normal.      Toenail Condition: Right toenails are ingrown.      Left foot:      Protective Sensation: 10 sites tested.  7 sites sensed.      Skin integrity: Skin integrity normal.      Comments: Diabetic foot exam:   Left: Filament test present in 7 of 10 sites tested   Pulses Dorsalis Pedis:  present   Proprioception normal   Sharp/dull discrimination normal       Right: Filament test present in 7 of 10 sites tested   Pulses Dorsalis Pedis:  present   Proprioception normal   Sharp/dull discrimination normal      Skin:     General: Skin is warm and dry.      Capillary Refill: Capillary refill takes less than 2 seconds.   Neurological:      General: No focal deficit present.      Mental Status: He is alert.        Result Review :  The following data was reviewed by: CHARLENE Thapa on 02/28/2024:  CMP          2/27/2024    23:00   CMP   Glucose 271    BUN 10    Creatinine 0.73    Sodium 136    Potassium 4.7    Chloride 97    Calcium 9.1    Total Protein 7.1    Albumin 4.6    Globulin 2.5    Total Bilirubin 0.5    Alkaline Phosphatase 84    AST (SGOT) 15    ALT (SGPT) 15    BUN/Creatinine Ratio 14      CBC w/diff          2/27/2024    23:00   CBC w/Diff   WBC 7.4    RBC 5.21    Hemoglobin 15.8    Hematocrit 47.0    MCV 90    MCH 30.3    MCHC 33.6    RDW 13.0    Platelets 220    Neutrophil Rel % 63    Lymphocyte Rel % 29    Monocyte Rel % 6    Eosinophil Rel % 1    Basophil Rel % 0      Lipid Panel          2/27/2024    23:00   Lipid Panel   Total Cholesterol 179    Triglycerides 547    HDL Cholesterol 29    VLDL Cholesterol 84    LDL Cholesterol  66      TSH          2/27/2024    23:00   TSH   TSH  1.520      A1C Last 3 Results          7/10/2023    09:54 2/27/2024    23:00   HGBA1C Last 3 Results   Hemoglobin A1C 7.1  10.8               Assessment and Plan   Diagnoses and all orders for this visit:    1. Encounter for annual physical exam (Primary)  -     MicroAlbumin, Urine, Random - Urine, Clean Catch  -     Hemoglobin A1c  -     CBC w AUTO Differential  -     Comprehensive Metabolic Panel  -     Lipid Panel  -     TSH  -     T4, Free    2. Type 2 diabetes mellitus with hyperglycemia, without long-term current use of insulin  -     MicroAlbumin, Urine, Random - Urine, Clean Catch  -     Comprehensive Metabolic Panel  -     TSH  -     T4, Free    3. Primary hypertension  -     MicroAlbumin, Urine, Random - Urine, Clean Catch  -     CBC w AUTO Differential  -     Comprehensive Metabolic Panel  -     Lipid Panel  -     TSH  -     T4, Free    4. Dyslipidemia  -     Lipid Panel    5. Hypertriglyceridemia  -     Lipid Panel    6. Encounter for hepatitis C screening test for low risk patient  -     Hepatitis C antibody    7. Ingrown right big toenail  -     Ambulatory Referral to Podiatry  -     bacitracin 500 UNIT/GM ointment; Apply 1 Application topically to the appropriate area as directed 2 (Two) Times a Day.  Dispense: 14.2 g; Refill: 0    Other orders  -     Please Note      Health Maintenance Counseling:  --Nutrition: Stressed importance of moderation in sodium/caffeine intake, saturated fat and cholesterol, caloric balance, sufficient intake of fresh fruits, vegetables, fiber, calcium and vit D  --Exercise: Recommended 30 minutes of exercise daily.  --Immunizations discussed and encouraged to include influenza, pneumococcal, TDAP.    - Blood pressure is improved but remains elevated. Discussed continuing to monitor this at home.  - Discussed foot care and soaking right foot in epsom salt and use of antibiotic ointment. Will refer to podiatry for ingrown toenail.         Follow Up   Return in about 6 months  (around 8/28/2024) for Recheck.  Patient was given instructions and counseling regarding his condition or for health maintenance advice. Please see specific information pulled into the AVS if appropriate.     Signed by:    CHARLENE Thapa Date: 03/02/24

## 2024-02-29 DIAGNOSIS — E11.65 TYPE 2 DIABETES MELLITUS WITH HYPERGLYCEMIA, WITHOUT LONG-TERM CURRENT USE OF INSULIN: Primary | ICD-10-CM

## 2024-02-29 DIAGNOSIS — E78.1 HYPERTRIGLYCERIDEMIA: ICD-10-CM

## 2024-02-29 LAB
ALBUMIN SERPL-MCNC: 4.6 G/DL (ref 4.3–5.2)
ALBUMIN/GLOB SERPL: 1.8 {RATIO} (ref 1.2–2.2)
ALP SERPL-CCNC: 84 IU/L (ref 44–121)
ALT SERPL-CCNC: 15 IU/L (ref 0–44)
AST SERPL-CCNC: 15 IU/L (ref 0–40)
BASOPHILS # BLD AUTO: 0 X10E3/UL (ref 0–0.2)
BASOPHILS NFR BLD AUTO: 0 %
BILIRUB SERPL-MCNC: 0.5 MG/DL (ref 0–1.2)
BUN SERPL-MCNC: 10 MG/DL (ref 6–20)
BUN/CREAT SERPL: 14 (ref 9–20)
CALCIUM SERPL-MCNC: 9.1 MG/DL (ref 8.7–10.2)
CHLORIDE SERPL-SCNC: 97 MMOL/L (ref 96–106)
CHOLEST SERPL-MCNC: 179 MG/DL (ref 100–199)
CO2 SERPL-SCNC: 22 MMOL/L (ref 20–29)
CREAT SERPL-MCNC: 0.73 MG/DL (ref 0.76–1.27)
EGFRCR SERPLBLD CKD-EPI 2021: 127 ML/MIN/1.73
EOSINOPHIL # BLD AUTO: 0.1 X10E3/UL (ref 0–0.4)
EOSINOPHIL NFR BLD AUTO: 1 %
ERYTHROCYTE [DISTWIDTH] IN BLOOD BY AUTOMATED COUNT: 13 % (ref 11.6–15.4)
GLOBULIN SER CALC-MCNC: 2.5 G/DL (ref 1.5–4.5)
GLUCOSE SERPL-MCNC: 271 MG/DL (ref 70–99)
HBA1C MFR BLD: 10.8 % (ref 4.8–5.6)
HCT VFR BLD AUTO: 47 % (ref 37.5–51)
HCV IGG SERPL QL IA: NON REACTIVE
HDLC SERPL-MCNC: 29 MG/DL
HGB BLD-MCNC: 15.8 G/DL (ref 13–17.7)
IMM GRANULOCYTES # BLD AUTO: 0.1 X10E3/UL (ref 0–0.1)
IMM GRANULOCYTES NFR BLD AUTO: 1 %
LDLC SERPL CALC-MCNC: 66 MG/DL (ref 0–99)
LYMPHOCYTES # BLD AUTO: 2.1 X10E3/UL (ref 0.7–3.1)
LYMPHOCYTES NFR BLD AUTO: 29 %
Lab: NORMAL
MCH RBC QN AUTO: 30.3 PG (ref 26.6–33)
MCHC RBC AUTO-ENTMCNC: 33.6 G/DL (ref 31.5–35.7)
MCV RBC AUTO: 90 FL (ref 79–97)
MICROALBUMIN UR-MCNC: 35 UG/ML
MONOCYTES # BLD AUTO: 0.4 X10E3/UL (ref 0.1–0.9)
MONOCYTES NFR BLD AUTO: 6 %
NEUTROPHILS # BLD AUTO: 4.7 X10E3/UL (ref 1.4–7)
NEUTROPHILS NFR BLD AUTO: 63 %
PLATELET # BLD AUTO: 220 X10E3/UL (ref 150–450)
POTASSIUM SERPL-SCNC: 4.7 MMOL/L (ref 3.5–5.2)
PROT SERPL-MCNC: 7.1 G/DL (ref 6–8.5)
RBC # BLD AUTO: 5.21 X10E6/UL (ref 4.14–5.8)
SODIUM SERPL-SCNC: 136 MMOL/L (ref 134–144)
T4 FREE SERPL-MCNC: 1.18 NG/DL (ref 0.82–1.77)
TRIGL SERPL-MCNC: 547 MG/DL (ref 0–149)
TSH SERPL DL<=0.005 MIU/L-ACNC: 1.52 UIU/ML (ref 0.45–4.5)
VLDLC SERPL CALC-MCNC: 84 MG/DL (ref 5–40)
WBC # BLD AUTO: 7.4 X10E3/UL (ref 3.4–10.8)

## 2024-02-29 RX ORDER — ORAL SEMAGLUTIDE 7 MG/1
7 TABLET ORAL DAILY
Qty: 30 TABLET | Refills: 2 | Status: SHIPPED | OUTPATIENT
Start: 2024-02-29

## 2024-02-29 NOTE — PROGRESS NOTES
Spoke with pt regarding labs.  Pt would like Farxiga to be sent to [facility].  He will  a marine omega 3 as well.  He will work on diet and exercise.  Pt voiced understanding and will call with any issues or concerns.

## 2024-03-11 ENCOUNTER — TELEPHONE (OUTPATIENT)
Dept: PODIATRY | Facility: CLINIC | Age: 29
End: 2024-03-11
Payer: COMMERCIAL

## 2024-03-11 NOTE — TELEPHONE ENCOUNTER
Called patient to let him know that there isn't an opening in [place] until May I left a message for him to call back to see if he would like to be seen here in [place]

## 2024-03-12 ENCOUNTER — TELEPHONE (OUTPATIENT)
Dept: VASCULAR SURGERY | Facility: CLINIC | Age: 29
End: 2024-03-12
Payer: COMMERCIAL

## 2024-03-12 NOTE — TELEPHONE ENCOUNTER
Called patient to let him know that there isn't an opening in [place] until May I left a message for him to call back to see if he would like to be seen here in [place]

## 2024-03-14 ENCOUNTER — TELEPHONE (OUTPATIENT)
Dept: PODIATRY | Facility: CLINIC | Age: 29
End: 2024-03-14
Payer: COMMERCIAL

## 2024-03-14 ENCOUNTER — TELEPHONE (OUTPATIENT)
Dept: INTERNAL MEDICINE | Facility: CLINIC | Age: 29
End: 2024-03-14
Payer: COMMERCIAL

## 2024-03-14 NOTE — TELEPHONE ENCOUNTER
[facility] IS CLOSING THE REFERRAL SINCE THEY WERE NOT ABLE TO REACH THE PATIENT.  HE CAN CALL THEIR OFFICE AND SHE WILL OPEN THE REFERRAL BACK UP.

## 2024-03-18 ENCOUNTER — PATIENT MESSAGE (OUTPATIENT)
Dept: INTERNAL MEDICINE | Facility: CLINIC | Age: 29
End: 2024-03-18
Payer: COMMERCIAL

## 2024-03-21 NOTE — TELEPHONE ENCOUNTER
Spoke to patient. He hasn't scheduled yet, but has the contact information to schedule. He plans to call soon.

## 2024-06-04 DIAGNOSIS — E78.5 DYSLIPIDEMIA: ICD-10-CM

## 2024-06-04 DIAGNOSIS — E11.65 TYPE 2 DIABETES MELLITUS WITH HYPERGLYCEMIA, WITHOUT LONG-TERM CURRENT USE OF INSULIN: ICD-10-CM

## 2024-06-04 DIAGNOSIS — L60.0 INGROWN RIGHT BIG TOENAIL: ICD-10-CM

## 2024-06-04 DIAGNOSIS — I10 PRIMARY HYPERTENSION: ICD-10-CM

## 2024-06-05 RX ORDER — DAPAGLIFLOZIN 10 MG/1
10 TABLET, FILM COATED ORAL DAILY
Qty: 30 TABLET | Refills: 2 | Status: SHIPPED | OUTPATIENT
Start: 2024-06-05

## 2024-06-05 RX ORDER — LISINOPRIL 20 MG/1
20 TABLET ORAL DAILY
Qty: 90 TABLET | Refills: 1 | Status: SHIPPED | OUTPATIENT
Start: 2024-06-05

## 2024-06-05 RX ORDER — ORAL SEMAGLUTIDE 7 MG/1
7 TABLET ORAL DAILY
Qty: 30 TABLET | Refills: 2 | Status: SHIPPED | OUTPATIENT
Start: 2024-06-05

## 2024-06-05 RX ORDER — FENOFIBRATE 145 MG/1
145 TABLET, COATED ORAL DAILY
Qty: 90 TABLET | Refills: 1 | Status: SHIPPED | OUTPATIENT
Start: 2024-06-05

## 2024-06-05 RX ORDER — GINSENG 100 MG
1 CAPSULE ORAL 2 TIMES DAILY
Qty: 14.2 G | Refills: 0 | Status: SHIPPED | OUTPATIENT
Start: 2024-06-05

## 2024-06-05 NOTE — TELEPHONE ENCOUNTER
Rx Refill Note  Requested Prescriptions     Pending Prescriptions Disp Refills    fenofibrate (Tricor) 145 MG tablet 90 tablet 1     Sig: Take 1 tablet by mouth Daily.    lisinopril (PRINIVIL,ZESTRIL) 20 MG tablet 90 tablet 1     Sig: Take 1 tablet by mouth Daily.    bacitracin 500 UNIT/GM ointment 14.2 g 0     Sig: Apply 1 Application topically to the appropriate area as directed 2 (Two) Times a Day.    Semaglutide (Rybelsus) 7 MG tablet 30 tablet 2     Sig: Take 7 mg by mouth Daily.    dapagliflozin Propanediol (Farxiga) 10 MG tablet 30 tablet 2     Sig: Take 10 mg by mouth Daily.      Last office visit with prescribing clinician: 2/28/2024   Last telemedicine visit with prescribing clinician: Visit date not found   Next office visit with prescribing clinician: 8/21/2024                         Would you like a call back once the refill request has been completed: [] Yes [] No    If the office needs to give you a call back, can they leave a voicemail: [] Yes [] No    Lisa Jiang RN  06/05/24, 07:10 CDT

## 2024-09-16 ENCOUNTER — OFFICE VISIT (OUTPATIENT)
Dept: INTERNAL MEDICINE | Facility: CLINIC | Age: 29
End: 2024-09-16
Payer: COMMERCIAL

## 2024-09-16 VITALS
DIASTOLIC BLOOD PRESSURE: 89 MMHG | HEART RATE: 112 BPM | BODY MASS INDEX: 39.14 KG/M2 | WEIGHT: 305 LBS | TEMPERATURE: 98.4 F | SYSTOLIC BLOOD PRESSURE: 130 MMHG | RESPIRATION RATE: 16 BRPM | HEIGHT: 74 IN | OXYGEN SATURATION: 99 %

## 2024-09-16 DIAGNOSIS — E11.65 TYPE 2 DIABETES MELLITUS WITH HYPERGLYCEMIA, WITHOUT LONG-TERM CURRENT USE OF INSULIN: Primary | ICD-10-CM

## 2024-09-16 DIAGNOSIS — I10 PRIMARY HYPERTENSION: ICD-10-CM

## 2024-09-16 DIAGNOSIS — E78.1 HYPERTRIGLYCERIDEMIA: ICD-10-CM

## 2024-09-16 LAB
EXPIRATION DATE: ABNORMAL
HBA1C MFR BLD: 10.1 % (ref 4.5–5.7)
Lab: ABNORMAL

## 2024-09-16 PROCEDURE — 99214 OFFICE O/P EST MOD 30 MIN: CPT

## 2024-09-16 PROCEDURE — 83036 HEMOGLOBIN GLYCOSYLATED A1C: CPT

## 2024-09-16 RX ORDER — SEMAGLUTIDE 1.34 MG/ML
1 INJECTION, SOLUTION SUBCUTANEOUS WEEKLY
Qty: 3 ML | Refills: 2 | Status: SHIPPED | OUTPATIENT
Start: 2024-10-14

## 2024-09-16 RX ORDER — SEMAGLUTIDE 0.68 MG/ML
0.5 INJECTION, SOLUTION SUBCUTANEOUS WEEKLY
Qty: 3 ML | Refills: 0 | Status: SHIPPED | OUTPATIENT
Start: 2024-09-16

## 2024-09-17 LAB
CHOLEST SERPL-MCNC: 175 MG/DL (ref 100–199)
HDLC SERPL-MCNC: 31 MG/DL
LDLC SERPL CALC-MCNC: 75 MG/DL (ref 0–99)
TRIGL SERPL-MCNC: 434 MG/DL (ref 0–149)
VLDLC SERPL CALC-MCNC: 69 MG/DL (ref 5–40)

## 2024-10-10 ENCOUNTER — TELEPHONE (OUTPATIENT)
Dept: FAMILY MEDICINE CLINIC | Facility: CLINIC | Age: 29
End: 2024-10-10
Payer: COMMERCIAL

## 2024-10-10 NOTE — TELEPHONE ENCOUNTER
Pt called stating he has been trying to get his ozempic for almost a month now but has been unsuccessful. He states the pharmacy is waiting on insurance approval and was advised to call his PCPs office for more information.

## 2024-10-14 NOTE — TELEPHONE ENCOUNTER
Sent pt SEJENT message to obtain prescription card info.  Unable to do PA without that information.   Statement Selected

## 2024-12-08 DIAGNOSIS — E11.65 TYPE 2 DIABETES MELLITUS WITH HYPERGLYCEMIA, WITHOUT LONG-TERM CURRENT USE OF INSULIN: ICD-10-CM

## 2024-12-09 RX ORDER — SEMAGLUTIDE 0.68 MG/ML
0.5 INJECTION, SOLUTION SUBCUTANEOUS WEEKLY
Qty: 3 ML | Refills: 0 | Status: SHIPPED | OUTPATIENT
Start: 2024-12-09

## 2024-12-11 ENCOUNTER — OFFICE VISIT (OUTPATIENT)
Dept: INTERNAL MEDICINE | Facility: CLINIC | Age: 29
End: 2024-12-11
Payer: COMMERCIAL

## 2024-12-11 VITALS
SYSTOLIC BLOOD PRESSURE: 128 MMHG | RESPIRATION RATE: 16 BRPM | TEMPERATURE: 98.6 F | OXYGEN SATURATION: 99 % | HEART RATE: 84 BPM | BODY MASS INDEX: 39.37 KG/M2 | DIASTOLIC BLOOD PRESSURE: 90 MMHG | HEIGHT: 74 IN | WEIGHT: 306.8 LBS

## 2024-12-11 DIAGNOSIS — I10 PRIMARY HYPERTENSION: ICD-10-CM

## 2024-12-11 DIAGNOSIS — E78.1 HYPERTRIGLYCERIDEMIA: ICD-10-CM

## 2024-12-11 DIAGNOSIS — E11.65 TYPE 2 DIABETES MELLITUS WITH HYPERGLYCEMIA, WITHOUT LONG-TERM CURRENT USE OF INSULIN: Primary | ICD-10-CM

## 2024-12-11 DIAGNOSIS — E78.5 DYSLIPIDEMIA: ICD-10-CM

## 2024-12-11 LAB
EXPIRATION DATE: ABNORMAL
HBA1C MFR BLD: 9.9 % (ref 4.5–5.7)
Lab: ABNORMAL

## 2024-12-11 PROCEDURE — 83036 HEMOGLOBIN GLYCOSYLATED A1C: CPT

## 2024-12-11 PROCEDURE — 99214 OFFICE O/P EST MOD 30 MIN: CPT

## 2024-12-11 RX ORDER — CHLORAL HYDRATE 500 MG
1000 CAPSULE ORAL
Qty: 90 CAPSULE | Refills: 1 | Status: SHIPPED | OUTPATIENT
Start: 2024-12-11

## 2024-12-11 RX ORDER — DAPAGLIFLOZIN 10 MG/1
10 TABLET, FILM COATED ORAL DAILY
Qty: 90 TABLET | Refills: 3 | Status: SHIPPED | OUTPATIENT
Start: 2024-12-11

## 2024-12-11 RX ORDER — LISINOPRIL 20 MG/1
20 TABLET ORAL DAILY
Qty: 90 TABLET | Refills: 3 | Status: SHIPPED | OUTPATIENT
Start: 2024-12-11

## 2024-12-11 RX ORDER — FENOFIBRATE 145 MG/1
145 TABLET, COATED ORAL DAILY
Qty: 90 TABLET | Refills: 3 | Status: SHIPPED | OUTPATIENT
Start: 2024-12-11

## 2024-12-11 NOTE — PROGRESS NOTES
Chief Complaint  Diabetes (Follow up)    Subjective        Nick Aguirre presents to [facility]  History of Present Illness  Joss Aguirre is a 29-year-old male presenting today for follow up visit.   He has not been checking his blood glucose. He is currently taking Farxiga. He had previously taken metformin but was not able to tolerate this medication. Had tried Rybelsus but did not notice any difference. He had taken ozempic in the past with improvement but was not able to continue due to shortage.     He has been checking his blood pressure regularly at home with it reported to be running 120's systolic over 80's diastolic.     Past Medical History:   Diagnosis Date    Diabetes mellitus      Past Surgical History:   Procedure Laterality Date    FRACTURE SURGERY       Social History     Socioeconomic History    Marital status: Single   Tobacco Use    Smoking status: Never     Passive exposure: Never    Smokeless tobacco: Never   Vaping Use    Vaping status: Never Used   Substance and Sexual Activity    Alcohol use: Never    Drug use: Never    Sexual activity: Never     Family History   Problem Relation Age of Onset    Diabetes Father     Cancer Maternal Grandfather     Cancer Brother        Medications:  Current Outpatient Medications   Medication Sig Dispense Refill    dapagliflozin Propanediol (Farxiga) 10 MG tablet Take 10 mg by mouth Daily. 90 tablet 3    fenofibrate (Tricor) 145 MG tablet Take 1 tablet by mouth Daily. 90 tablet 3    lisinopril (PRINIVIL,ZESTRIL) 20 MG tablet Take 1 tablet by mouth Daily. 90 tablet 3    Omega-3 Fatty Acids (fish oil) 1000 MG capsule capsule Take 1 capsule by mouth Daily With Breakfast. 90 capsule 1    Semaglutide,0.25 or 0.5MG/DOS, (Ozempic, 0.25 or 0.5 MG/DOSE,) 2 MG/3ML solution pen-injector Inject 0.5 mg under the skin into the appropriate area as directed 1 (One) Time Per Week. (Patient not taking: Reported on 12/11/2024) 3 mL 0     No current  "facility-administered medications for this visit.       Review of Systems  Review of systems is negative unless otherwise specified in HPI.    Objective   Vital Signs:  /90 (BP Location: Left arm, Patient Position: Sitting)   Pulse 84   Temp 98.6 °F (37 °C) (Infrared)   Resp 16   Ht 188 cm (74\")   Wt (!) 139 kg (306 lb 12.8 oz)   SpO2 99%   BMI 39.39 kg/m²   Estimated body mass index is 39.39 kg/m² as calculated from the following:    Height as of this encounter: 188 cm (74\").    Weight as of this encounter: 139 kg (306 lb 12.8 oz).            Physical Exam  Vitals and nursing note reviewed.   Constitutional:       General: He is not in acute distress.     Appearance: He is obese. He is not ill-appearing.   HENT:      Head: Normocephalic.      Nose: Nose normal.      Mouth/Throat:      Mouth: Mucous membranes are moist.      Pharynx: Oropharynx is clear.   Eyes:      Pupils: Pupils are equal, round, and reactive to light.   Cardiovascular:      Rate and Rhythm: Normal rate.   Pulmonary:      Effort: Pulmonary effort is normal. No respiratory distress.   Musculoskeletal:         General: Normal range of motion.      Cervical back: Normal range of motion.   Skin:     General: Skin is warm and dry.      Capillary Refill: Capillary refill takes less than 2 seconds.   Neurological:      General: No focal deficit present.      Mental Status: He is alert.          Result Review :  The following data was reviewed by: CHARLENE Thapa on 12/11/2024:  CMP          2/27/2024    23:00   CMP   Glucose 271    BUN 10    Creatinine 0.73    Sodium 136    Potassium 4.7    Chloride 97    Calcium 9.1    Total Protein 7.1    Albumin 4.6    Globulin 2.5    Total Bilirubin 0.5    Alkaline Phosphatase 84    AST (SGOT) 15    ALT (SGPT) 15    BUN/Creatinine Ratio 14      CBC w/diff          2/27/2024    23:00   CBC w/Diff   WBC 7.4    RBC 5.21    Hemoglobin 15.8    Hematocrit 47.0    MCV 90    MCH 30.3    MCHC 33.6    RDW " 13.0    Platelets 220    Neutrophil Rel % 63    Lymphocyte Rel % 29    Monocyte Rel % 6    Eosinophil Rel % 1    Basophil Rel % 0      Lipid Panel          2/27/2024    23:00 9/15/2024    23:00   Lipid Panel   Total Cholesterol 179  175    Triglycerides 547  434    HDL Cholesterol 29  31    VLDL Cholesterol 84  69    LDL Cholesterol  66  75      TSH          2/27/2024    23:00   TSH   TSH 1.520      A1C Last 3 Results          2/27/2024    23:00 9/16/2024    11:54 12/11/2024    09:02   HGBA1C Last 3 Results   Hemoglobin A1C 10.8  10.1  9.9                 Assessment and Plan   Diagnoses and all orders for this visit:    1. Type 2 diabetes mellitus with hyperglycemia, without long-term current use of insulin (Primary)  -     dapagliflozin Propanediol (Farxiga) 10 MG tablet; Take 10 mg by mouth Daily.  Dispense: 90 tablet; Refill: 3  -     POC Glycosylated Hemoglobin (Hb A1C)    2. Primary hypertension  -     lisinopril (PRINIVIL,ZESTRIL) 20 MG tablet; Take 1 tablet by mouth Daily.  Dispense: 90 tablet; Refill: 3    3. Hypertriglyceridemia  -     Omega-3 Fatty Acids (fish oil) 1000 MG capsule capsule; Take 1 capsule by mouth Daily With Breakfast.  Dispense: 90 capsule; Refill: 1    4. Dyslipidemia  -     fenofibrate (Tricor) 145 MG tablet; Take 1 tablet by mouth Daily.  Dispense: 90 tablet; Refill: 3      -Diabetes has not been controlled.  Will restart Ozempic.  -Hypertension is controlled.  Continue current medications.  -Triglycerides have remained elevated.  Continue fenofibrate and will add omega-3 fatty acids.         I spent 30 minutes caring for Nick on this date of service. This time includes time spent by me in the following activities:preparing for the visit, reviewing tests, performing a medically appropriate examination and/or evaluation , counseling and educating the patient/family/caregiver, ordering medications, tests, or procedures, and documenting information in the medical record    Follow Up    Return in about 3 months (around 3/11/2025) for Recheck.  Patient was given instructions and counseling regarding his condition or for health maintenance advice. Please see specific information pulled into the AVS if appropriate.     Signed by:    CHARLENE Thapa Date: 12/11/24

## 2024-12-12 ENCOUNTER — PRIOR AUTHORIZATION (OUTPATIENT)
Dept: INTERNAL MEDICINE | Facility: CLINIC | Age: 29
End: 2024-12-12
Payer: COMMERCIAL

## 2024-12-16 ENCOUNTER — TELEPHONE (OUTPATIENT)
Dept: INTERNAL MEDICINE | Facility: CLINIC | Age: 29
End: 2024-12-16
Payer: COMMERCIAL

## 2024-12-16 NOTE — TELEPHONE ENCOUNTER
"    Caller: Nick Aguirre \"WILL\"    Relationship to patient: Self      Best call back number: [phone]     Provider: RON    Medication PA needed: OZEMPIC    Reason for call/Prior Auth: [facility] NOTIFIED PATIENT       "

## 2024-12-19 NOTE — TELEPHONE ENCOUNTER
SPOKE TO PT AND HE VERIFIED THAT HE WAS ABLE TO GET OZEMPIC.  NOTHING FURTHER NEEDED AT THIS TIME.

## 2025-01-12 DIAGNOSIS — E11.65 TYPE 2 DIABETES MELLITUS WITH HYPERGLYCEMIA, WITHOUT LONG-TERM CURRENT USE OF INSULIN: ICD-10-CM

## 2025-01-13 ENCOUNTER — TELEPHONE (OUTPATIENT)
Dept: INTERNAL MEDICINE | Facility: CLINIC | Age: 30
End: 2025-01-13
Payer: COMMERCIAL

## 2025-01-13 RX ORDER — SEMAGLUTIDE 0.68 MG/ML
0.5 INJECTION, SOLUTION SUBCUTANEOUS WEEKLY
Qty: 3 ML | Refills: 0 | Status: SHIPPED | OUTPATIENT
Start: 2025-01-13

## 2025-01-13 NOTE — TELEPHONE ENCOUNTER
Called to confirm dosing of Ozempic prior to refill.  Patient reported having been taking the 0.25 mg dose and just finished that.  He tolerated this and plans to increase to 0.5 mg weekly.  Will send refill as 0.5 mg weekly.

## 2025-01-13 NOTE — TELEPHONE ENCOUNTER
Rx Refill Note  Requested Prescriptions     Pending Prescriptions Disp Refills    Ozempic, 0.25 or 0.5 MG/DOSE, 2 MG/3ML solution pen-injector [Pharmacy Med Name: OZEMPIC 0.25-0.5 MG/DOSE PEN]       Sig: INJECT 0.5 MG UNDER THE SKIN INTO THE APPROPRIATE AREA AS DIRECTED 1 (ONE) TIME PER WEEK.      Last office visit with prescribing clinician: 12/11/2024   Last telemedicine visit with prescribing clinician: Visit date not found   Next office visit with prescribing clinician: 3/12/2025                         Would you like a call back once the refill request has been completed: [] Yes [] No    If the office needs to give you a call back, can they leave a voicemail: [] Yes [] No    Lisa Jiang RN  01/13/25, 07:14 CST

## 2025-03-12 ENCOUNTER — OFFICE VISIT (OUTPATIENT)
Dept: FAMILY MEDICINE CLINIC | Facility: CLINIC | Age: 30
End: 2025-03-12
Payer: COMMERCIAL

## 2025-03-12 VITALS
BODY MASS INDEX: 38.78 KG/M2 | DIASTOLIC BLOOD PRESSURE: 85 MMHG | SYSTOLIC BLOOD PRESSURE: 138 MMHG | HEIGHT: 74 IN | HEART RATE: 79 BPM | WEIGHT: 302.2 LBS | TEMPERATURE: 98.7 F

## 2025-03-12 DIAGNOSIS — E78.1 HYPERTRIGLYCERIDEMIA: ICD-10-CM

## 2025-03-12 DIAGNOSIS — E11.65 TYPE 2 DIABETES MELLITUS WITH HYPERGLYCEMIA, WITHOUT LONG-TERM CURRENT USE OF INSULIN: Primary | ICD-10-CM

## 2025-03-12 DIAGNOSIS — I10 PRIMARY HYPERTENSION: ICD-10-CM

## 2025-03-12 LAB
EXPIRATION DATE: ABNORMAL
HBA1C MFR BLD: 10 % (ref 4.5–5.7)
Lab: ABNORMAL

## 2025-03-12 RX ORDER — GLIPIZIDE 5 MG/1
5 TABLET ORAL
Qty: 30 TABLET | Refills: 2 | Status: SHIPPED | OUTPATIENT
Start: 2025-03-12

## 2025-03-12 RX ORDER — SEMAGLUTIDE 1.34 MG/ML
1 INJECTION, SOLUTION SUBCUTANEOUS WEEKLY
Qty: 3 ML | Refills: 2 | Status: SHIPPED | OUTPATIENT
Start: 2025-03-12

## 2025-03-12 NOTE — PROGRESS NOTES
"Chief Complaint  Follow-up (3 mo )    Subjective        Nick Aguirre presents to [facility]  History of Present Illness  Will is a 30-year-old male presenting today for routine follow up.  He reports feeling better since start Ozempic. Has been tolerating 0.5 mg dose. Occasionaly will check his glucose with it being \"decent\" with last remembered being around 110. He has been working on his diet.     He does not check his blood pressure very often. He has been taking lisinopril without issue. His checks have been doing ok.      Past Medical History:   Diagnosis Date    Diabetes mellitus      Past Surgical History:   Procedure Laterality Date    FRACTURE SURGERY       Social History     Socioeconomic History    Marital status: Single   Tobacco Use    Smoking status: Never     Passive exposure: Never    Smokeless tobacco: Never   Vaping Use    Vaping status: Never Used   Substance and Sexual Activity    Alcohol use: Never    Drug use: Never    Sexual activity: Never     Family History   Problem Relation Age of Onset    Diabetes Father     Cancer Maternal Grandfather     Cancer Brother        Medications:  Current Outpatient Medications   Medication Sig Dispense Refill    empagliflozin (Jardiance) 10 MG tablet tablet Take 1 tablet by mouth Daily. 30 tablet 2    fenofibrate (Tricor) 145 MG tablet Take 1 tablet by mouth Daily. 90 tablet 3    lisinopril (PRINIVIL,ZESTRIL) 20 MG tablet Take 1 tablet by mouth Daily. 90 tablet 3    Omega-3 Fatty Acids (fish oil) 1000 MG capsule capsule Take 1 capsule by mouth Daily With Breakfast. 90 capsule 1    glipizide (GLUCOTROL) 5 MG tablet Take 1 tablet by mouth 2 (Two) Times a Day Before Meals. 30 tablet 2    Semaglutide, 1 MG/DOSE, (Ozempic, 1 MG/DOSE,) 4 MG/3ML solution pen-injector Inject 1 mg under the skin into the appropriate area as directed 1 (One) Time Per Week. 3 mL 2     No current facility-administered medications for this visit. " "      Review of Systems  Review of systems is negative unless otherwise specified in HPI.    Objective   Vital Signs:  /85 (BP Location: Right arm, Patient Position: Sitting, Cuff Size: Large Adult)   Pulse 79   Temp 98.7 °F (37.1 °C) (Infrared)   Ht 188 cm (74.02\")   Wt (!) 137 kg (302 lb 3.2 oz)   BMI 38.78 kg/m²   Estimated body mass index is 38.78 kg/m² as calculated from the following:    Height as of this encounter: 188 cm (74.02\").    Weight as of this encounter: 137 kg (302 lb 3.2 oz).          PHQ-9 Depression Screening  Little interest or pleasure in doing things? Not at all   Feeling down, depressed, or hopeless? Not at all   PHQ-2 Total Score 0   Trouble falling or staying asleep, or sleeping too much?     Feeling tired or having little energy?     Poor appetite or overeating?     Feeling bad about yourself - or that you are a failure or have let yourself or your family down?     Trouble concentrating on things, such as reading the newspaper or watching television?     Moving or speaking so slowly that other people could have noticed? Or the opposite - being so fidgety or restless that you have been moving around a lot more than usual?     Thoughts that you would be better off dead, or of hurting yourself in some way?     PHQ-9 Total Score     If you checked off any problems, how difficult have these problems made it for you to do your work, take care of things at home, or get along with other people? Not difficult at all        Physical Exam  Vitals and nursing note reviewed.   Constitutional:       General: He is not in acute distress.     Appearance: He is obese. He is not ill-appearing.   HENT:      Head: Normocephalic.      Right Ear: Tympanic membrane normal.      Left Ear: Tympanic membrane normal.      Nose: Nose normal.      Mouth/Throat:      Mouth: Mucous membranes are moist.   Eyes:      Pupils: Pupils are equal, round, and reactive to light.   Cardiovascular:      Rate and Rhythm: " Normal rate and regular rhythm.      Pulses: Normal pulses.      Heart sounds: Normal heart sounds.   Pulmonary:      Effort: Pulmonary effort is normal. No respiratory distress.      Breath sounds: Normal breath sounds.   Abdominal:      General: Bowel sounds are normal.      Palpations: Abdomen is soft.   Musculoskeletal:         General: Normal range of motion.      Cervical back: Normal range of motion.   Skin:     General: Skin is warm and dry.      Capillary Refill: Capillary refill takes less than 2 seconds.   Neurological:      General: No focal deficit present.      Mental Status: He is alert.          Result Review :  The following data was reviewed by: CHARLENE Thapa on 03/12/2025:  A1C Last 3 Results          9/16/2024    11:54 12/11/2024    09:02 3/12/2025    09:06   HGBA1C Last 3 Results   Hemoglobin A1C 10.1  9.9  10.0             Assessment and Plan   Diagnoses and all orders for this visit:    1. Type 2 diabetes mellitus with hyperglycemia, without long-term current use of insulin (Primary)  -     Microalbumin / Creatinine Urine Ratio - Urine, Clean Catch  -     Comprehensive Metabolic Panel  -     POC Glycosylated Hemoglobin (Hb A1C)  -     Semaglutide, 1 MG/DOSE, (Ozempic, 1 MG/DOSE,) 4 MG/3ML solution pen-injector; Inject 1 mg under the skin into the appropriate area as directed 1 (One) Time Per Week.  Dispense: 3 mL; Refill: 2  -     glipizide (GLUCOTROL) 5 MG tablet; Take 1 tablet by mouth 2 (Two) Times a Day Before Meals.  Dispense: 30 tablet; Refill: 2  -     empagliflozin (Jardiance) 10 MG tablet tablet; Take 1 tablet by mouth Daily.  Dispense: 30 tablet; Refill: 2    2. Primary hypertension  -     Lipid Panel  -     CBC & Differential  -     TSH    3. Hypertriglyceridemia  -     Lipid Panel  -     CBC & Differential      - Diabetes remains uncontrolled. Will increase Ozempic to 1 mg weekly. With not having started Farxiga will start on Jardiance. To work on getting better control  will also start on Glipizide. Instructed to monitor blood glucose with this having risk of hypoglycemia, with increasing Ozempic and starting Jardiance will need to monitor and discontinue if develops hypoglycemia.  - Hypertension is stable. Discussed would like for blood pressure to be less than 130/80. Encouraged to monitor blood pressure and report back if remaining elevated.            Follow Up   Return in about 3 months (around 6/12/2025) for Annual physical.  Patient was given instructions and counseling regarding his condition or for health maintenance advice. Please see specific information pulled into the AVS if appropriate.     Signed by:    CHARLENE Thapa Date: 03/12/25

## 2025-03-12 NOTE — PROGRESS NOTES
Venipuncture Blood Specimen Collection  Venipuncture performed in [facility] by Stephenie Jimenez CMA with good hemostasis. Patient tolerated the procedure well without complications.   03/12/25   Stephenie Jimenez CMA

## 2025-03-13 LAB
ALBUMIN SERPL-MCNC: 4.7 G/DL (ref 4.3–5.2)
ALBUMIN/CREAT UR: 7 MG/G CREAT (ref 0–29)
ALP SERPL-CCNC: 89 IU/L (ref 44–121)
ALT SERPL-CCNC: 20 IU/L (ref 0–44)
AST SERPL-CCNC: 21 IU/L (ref 0–40)
BASOPHILS # BLD AUTO: 0.1 X10E3/UL (ref 0–0.2)
BASOPHILS NFR BLD AUTO: 1 %
BILIRUB SERPL-MCNC: 0.4 MG/DL (ref 0–1.2)
BUN SERPL-MCNC: 10 MG/DL (ref 6–20)
BUN/CREAT SERPL: 16 (ref 9–20)
CALCIUM SERPL-MCNC: 9.4 MG/DL (ref 8.7–10.2)
CHLORIDE SERPL-SCNC: 97 MMOL/L (ref 96–106)
CHOLEST SERPL-MCNC: 158 MG/DL (ref 100–199)
CO2 SERPL-SCNC: 21 MMOL/L (ref 20–29)
CREAT SERPL-MCNC: 0.62 MG/DL (ref 0.76–1.27)
CREAT UR-MCNC: 117.9 MG/DL
EGFRCR SERPLBLD CKD-EPI 2021: 133 ML/MIN/1.73
EOSINOPHIL # BLD AUTO: 0.1 X10E3/UL (ref 0–0.4)
EOSINOPHIL NFR BLD AUTO: 1 %
ERYTHROCYTE [DISTWIDTH] IN BLOOD BY AUTOMATED COUNT: 12.9 % (ref 11.6–15.4)
GLOBULIN SER CALC-MCNC: 2.4 G/DL (ref 1.5–4.5)
GLUCOSE SERPL-MCNC: 257 MG/DL (ref 70–99)
HCT VFR BLD AUTO: 48.1 % (ref 37.5–51)
HDLC SERPL-MCNC: 31 MG/DL
HGB BLD-MCNC: 15.7 G/DL (ref 13–17.7)
IMM GRANULOCYTES # BLD AUTO: 0.1 X10E3/UL (ref 0–0.1)
IMM GRANULOCYTES NFR BLD AUTO: 1 %
LDLC SERPL CALC-MCNC: 80 MG/DL (ref 0–99)
LYMPHOCYTES # BLD AUTO: 2.3 X10E3/UL (ref 0.7–3.1)
LYMPHOCYTES NFR BLD AUTO: 33 %
MCH RBC QN AUTO: 29.6 PG (ref 26.6–33)
MCHC RBC AUTO-ENTMCNC: 32.6 G/DL (ref 31.5–35.7)
MCV RBC AUTO: 91 FL (ref 79–97)
MICROALBUMIN UR-MCNC: 8.1 UG/ML
MONOCYTES # BLD AUTO: 0.4 X10E3/UL (ref 0.1–0.9)
MONOCYTES NFR BLD AUTO: 5 %
NEUTROPHILS # BLD AUTO: 4.1 X10E3/UL (ref 1.4–7)
NEUTROPHILS NFR BLD AUTO: 59 %
PLATELET # BLD AUTO: 231 X10E3/UL (ref 150–450)
POTASSIUM SERPL-SCNC: 4.5 MMOL/L (ref 3.5–5.2)
PROT SERPL-MCNC: 7.1 G/DL (ref 6–8.5)
RBC # BLD AUTO: 5.3 X10E6/UL (ref 4.14–5.8)
SODIUM SERPL-SCNC: 134 MMOL/L (ref 134–144)
TRIGL SERPL-MCNC: 284 MG/DL (ref 0–149)
TSH SERPL DL<=0.005 MIU/L-ACNC: 1.55 UIU/ML (ref 0.45–4.5)
VLDLC SERPL CALC-MCNC: 47 MG/DL (ref 5–40)
WBC # BLD AUTO: 7 X10E3/UL (ref 3.4–10.8)

## 2025-06-18 ENCOUNTER — OFFICE VISIT (OUTPATIENT)
Dept: INTERNAL MEDICINE | Facility: CLINIC | Age: 30
End: 2025-06-18
Payer: COMMERCIAL

## 2025-06-18 VITALS
TEMPERATURE: 97.7 F | DIASTOLIC BLOOD PRESSURE: 84 MMHG | OXYGEN SATURATION: 98 % | HEIGHT: 74 IN | BODY MASS INDEX: 39.78 KG/M2 | RESPIRATION RATE: 16 BRPM | WEIGHT: 310 LBS | SYSTOLIC BLOOD PRESSURE: 136 MMHG | HEART RATE: 83 BPM

## 2025-06-18 DIAGNOSIS — E11.65 TYPE 2 DIABETES MELLITUS WITH HYPERGLYCEMIA, WITHOUT LONG-TERM CURRENT USE OF INSULIN: ICD-10-CM

## 2025-06-18 DIAGNOSIS — E66.01 CLASS 2 SEVERE OBESITY DUE TO EXCESS CALORIES WITH SERIOUS COMORBIDITY AND BODY MASS INDEX (BMI) OF 39.0 TO 39.9 IN ADULT: ICD-10-CM

## 2025-06-18 DIAGNOSIS — I10 PRIMARY HYPERTENSION: ICD-10-CM

## 2025-06-18 DIAGNOSIS — Z00.00 ENCOUNTER FOR ANNUAL PHYSICAL EXAM: Primary | ICD-10-CM

## 2025-06-18 DIAGNOSIS — E66.812 CLASS 2 SEVERE OBESITY DUE TO EXCESS CALORIES WITH SERIOUS COMORBIDITY AND BODY MASS INDEX (BMI) OF 39.0 TO 39.9 IN ADULT: ICD-10-CM

## 2025-06-18 LAB
EXPIRATION DATE: ABNORMAL
HBA1C MFR BLD: 7.7 % (ref 4.5–5.7)
Lab: ABNORMAL

## 2025-06-18 RX ORDER — SEMAGLUTIDE 2.68 MG/ML
2 INJECTION, SOLUTION SUBCUTANEOUS WEEKLY
Qty: 3 ML | Refills: 2 | Status: SHIPPED | OUTPATIENT
Start: 2025-06-18

## 2025-06-18 NOTE — PROGRESS NOTES
Chief Complaint  Annual Exam    Subjective        Nick Aguirre presents to [facility]  History of Present Illness  Patient is a 30-year-old male presenting today for annual physical. He has no complaints today and has actually felt better.     He has been taking ozempic 1 mg and glipizide for his diabetes. Reports pharmacy said needing approval for Jardiance so has not started. Does not check his blood glucose but does have a monitor.    He does check his blood pressure intermittently with it running around 120-130 over 80.       Past Medical History:   Diagnosis Date    Diabetes mellitus      Past Surgical History:   Procedure Laterality Date    FRACTURE SURGERY       Social History     Socioeconomic History    Marital status: Single   Tobacco Use    Smoking status: Never     Passive exposure: Never    Smokeless tobacco: Never   Vaping Use    Vaping status: Never Used   Substance and Sexual Activity    Alcohol use: Never    Drug use: Never    Sexual activity: Never     Family History   Problem Relation Age of Onset    Diabetes Father     Cancer Maternal Grandfather     Cancer Brother        Medications:  Current Outpatient Medications   Medication Sig Dispense Refill    fenofibrate (Tricor) 145 MG tablet Take 1 tablet by mouth Daily. 90 tablet 3    glipizide (GLUCOTROL) 5 MG tablet Take 1 tablet by mouth 2 (Two) Times a Day Before Meals. 30 tablet 2    lisinopril (PRINIVIL,ZESTRIL) 20 MG tablet Take 1 tablet by mouth Daily. 90 tablet 3    Omega-3 Fatty Acids (fish oil) 1000 MG capsule capsule Take 1 capsule by mouth Daily With Breakfast. 90 capsule 1    empagliflozin (Jardiance) 10 MG tablet tablet Take 1 tablet by mouth Daily. (Patient not taking: Reported on 6/18/2025) 30 tablet 2    Semaglutide, 2 MG/DOSE, (Ozempic, 2 MG/DOSE,) 8 MG/3ML solution pen-injector Inject 2 mg under the skin into the appropriate area as directed 1 (One) Time Per Week. 3 mL 2     No current  OFFICE VISIT    Patient: Elvin Fernandez Jr.   : 1951 MRN: [id number]    SUBJECTIVE:  Chief Complaint   Patient presents with    Blood Pressure     Blood pressure follow up. Patient lost his cat last week.    Medication Issue       A 73 year old male presents for a follow-up of hypertension and other medical conditions.     HISTORY OF PRESENT ILLNESS:     Historian: Self.       Left foot pain: Complains of intermittent severe pain/discomfort and burning sensation on the dorsal and medial aspect of the left foot by the great toe at the first metatarsophalangeal joint area for a couple of years. It is progressively worsening. Denies doing X-rays in the past. He admits sciatic nerve pain.    Depression; Grief reaction: He is feeling down as his dog which was with him for 12 years  with cancer of the spleen.    Intermittent claudication: He has intermittent claudication.     Hypertension: He has hypertension. On irbesartan-hydrochlorothiazide (AVALIDE) 150-12.5 mg daily.    Lumbar degenerative disc disease: He has lumbar degenerative disc disease. He is doing exercises with benefits.    Hypothyroidism: He has hypothyroidism. On levothyroxine (Synthroid) 100 mcg daily. Request refills.    History of Bell's palsy: He has a history of Bell's palsy.    Benign prostatic hyperplasia with urinary hesitancy: He has benign prostatic hyperplasia with urinary hesitancy. Taking tamsulosin (FLOMAX) 0.4 mg twice daily. Upcoming appointment with urologist.  He had a bladder tumor. Denies any concerns now.    Left-sided chest pain: Mentions occasional left-sided chest pain for six months. Denies SOB with it. Admits coughing up clear phlegm. Denies smoking. Denies swelling in the legs.    Additional comments  He is scheduled for a cataract surgery and has a pre-op visit in .    PAST MEDICAL HISTORY:  Past Medical History:   Diagnosis Date    Abdominal pain     Back pain     Change in voice     Cough     Dyspnea     with  "facility-administered medications for this visit.       Review of Systems  Review of systems is negative unless otherwise specified in HPI.    Objective   Vital Signs:  /84 (BP Location: Left arm, Patient Position: Sitting, Cuff Size: Adult)   Pulse 83   Temp 97.7 °F (36.5 °C) (Infrared)   Resp 16   Ht 188 cm (74\")   Wt (!) 141 kg (310 lb)   SpO2 98%   BMI 39.80 kg/m²   Estimated body mass index is 39.8 kg/m² as calculated from the following:    Height as of this encounter: 188 cm (74\").    Weight as of this encounter: 141 kg (310 lb).       Class 2 Severe Obesity (BMI >=35 and <=39.9). Obesity-related health conditions include the following: hypertension, diabetes mellitus, and dyslipidemias. Obesity is worsening. BMI is is above average; BMI management plan is completed. We discussed low calorie, low carb based diet program, portion control, and increasing exercise.      PHQ-9 Depression Screening  Little interest or pleasure in doing things? Not at all   Feeling down, depressed, or hopeless? Not at all   PHQ-2 Total Score 0   Trouble falling or staying asleep, or sleeping too much?     Feeling tired or having little energy?     Poor appetite or overeating?     Feeling bad about yourself - or that you are a failure or have let yourself or your family down?     Trouble concentrating on things, such as reading the newspaper or watching television?     Moving or speaking so slowly that other people could have noticed? Or the opposite - being so fidgety or restless that you have been moving around a lot more than usual?     Thoughts that you would be better off dead, or of hurting yourself in some way?     PHQ-9 Total Score     If you checked off any problems, how difficult have these problems made it for you to do your work, take care of things at home, or get along with other people?          ALESSANDRA-7: Over the last two weeks, how often have you been bothered by the following problems?  Feeling nervous, " exercise and cat exposure.    Ear itching     Ear popping     Eye swelling     Eye tearing     Fatigue     Headache     History of herpes genitalis     History of itching of eye     Hives     HTN (hypertension)     Hx of colonoscopy 04/11/2011    Colorectal Cancer Screening-Colonoscopy (Every 10 Years) Dr Madison     Hyperlipidemia     Hypothyroidism     Hypothyroidism     Increased frequency of urination     Nasal congestion     Numbness     Post-nasal drip     Pruritus     Rash     Tinnitus     Vitamin D deficiency     Weakness     Wears glasses     Wheeze      MEDICATIONS:  Current Outpatient Medications   Medication Sig Dispense Refill    tamsulosin (FLOMAX) 0.4 MG Cap Take 1 capsule by mouth in the morning and 1 capsule in the evening. Take after meals. 180 capsule 3    irbesartan-hydrochlorothiazide (AVALIDE) 150-12.5 MG per tablet Take 1 tablet by mouth daily. 90 tablet 3    levothyroxine (Synthroid) 100 MCG tablet Take 1 tablet by mouth daily. 90 tablet 4    cholecalciferol (VITAMIN D3) 25 mcg (1,000 units) tablet Take by mouth daily.       No current facility-administered medications for this visit.     ALLERGIES:  Allergies as of 03/15/2024    (No Known Allergies)     FAMILY HISTORY:  Family History   Problem Relation Age of Onset    Diabetes Mother     Diabetes Maternal Grandfather     Heart disease Maternal Grandfather      SOCIAL HISTORY:  Social History     Tobacco Use    Smoking status: Never    Smokeless tobacco: Never   Vaping Use    Vaping Use: never used   Substance Use Topics    Alcohol use: No     Alcohol/week: 0.0 standard drinks of alcohol    Drug use: Not Currently     Comment: pt. denies using marijuana     Past Surgical HISTORY  Past Surgical History:   Procedure Laterality Date    Colonoscopy  04/11/2011    Polyp x 1 r emoved, diverticulosis coli, internal hemorrhoids    Inguinal hernia repair  2004       REVIEW OF SYSTEMS:    Cardiovascular:As per HPI.  Respiratory:  As per  HPI.  Gastrointestinal: She denies nausea, vomiting, diarrhea or constipation. No melena or rectal bleeding. No heartburn.   Musculoskeletal:  As per HPI.  Urinary: As per HPI.  Psychiatry:  As per HPI.  ROS was obtained as per above and HPI and all other systems reviewed otherwise negative.    OBJECTIVE:  Visit Vitals  /70 (BP Location: RUE - Right upper extremity, Patient Position: Sitting, Cuff Size: Regular)   Pulse 70   Temp 98 °F (36.7 °C) (Oral)   Resp 16   Ht 5' 7\"   Wt 74.5 kg (164 lb 3.9 oz)   SpO2 98%   BMI 25.72 kg/m²       PHYSICAL EXAM:    GENERAL: Patient is well-developed, well-nourished, alert, awake, in no apparent distress.  HEENT:The pupils are equally round and reactive to light and accommodation, good red reflex. Sinuses are nontender, nasal turbinates are clear, nasal septum is in the midline. Tympanic membranes and auditory canals are clear. Pharynx is clear, tongue is moist.   Neck is supple, no thyromegaly, no supraclavicular lymph nodes, bruit, or JVD.  Chest: Symmetrical and no retractions.  Lungs:  Essentially clear to auscultation, good air movements, no rales, rhonchi or wheezing.  Heart:  Regular rate and rhythm, no murmurs or ectopic beats.   Abdomen: Flat, soft and nontender, no masses, hepatosplenomegaly, good bowel sounds. No costovertebral angle  tenderness.  Extremities: move all extremities well. No edema, clubbing, cyanosis  Neurological: he is alert and oriented to person, place, and time. he has normal reflexes. No cranial nerve deficit. he exhibits normal muscle tone. Coordination normal.   Skin: Skin is warm and dry. No rash noted. he is not diaphoretic. No erythema. No pallor.   Psychiatric: he  has a normal mood and affect. Judgment normal.     DIAGNOSTIC STUDIES:  LAB RESULTS:  No visits with results within 1 Month(s) from this visit.   Latest known visit with results is:   Lab Services on 10/30/2023   Component Date Value Ref Range Status    Fasting Status  anxious or on edge: Not at all  Not being able to stop or control worrying: Not at all  Worrying too much about different things: Not at all  Trouble Relaxing: Not at all  Being so restless that it is hard to sit still: Not at all  Becoming easily annoyed or irritable: Not at all  Feeling afraid as if something awful might happen: Not at all  ALESSANDRA 7 Total Score: 0  If you checked any problems, how difficult have these problems made it for you to do your work, take care of things at home, or get along with other people: Not difficult at all      Physical Exam  Vitals and nursing note reviewed.   Constitutional:       General: He is not in acute distress.     Appearance: He is obese. He is not ill-appearing.   HENT:      Head: Normocephalic.      Nose: Nose normal.      Mouth/Throat:      Mouth: Mucous membranes are moist.   Eyes:      Pupils: Pupils are equal, round, and reactive to light.   Cardiovascular:      Rate and Rhythm: Normal rate and regular rhythm.      Pulses: Normal pulses.           Dorsalis pedis pulses are 2+ on the right side and 2+ on the left side.      Heart sounds: Normal heart sounds.   Pulmonary:      Effort: Pulmonary effort is normal. No respiratory distress.      Breath sounds: Normal breath sounds.   Abdominal:      General: Bowel sounds are normal.      Palpations: Abdomen is soft.   Musculoskeletal:         General: Normal range of motion.      Cervical back: Normal range of motion and neck supple.   Feet:      Right foot:      Protective Sensation: 10 sites tested.  9 sites sensed.      Skin integrity: Skin integrity normal.      Toenail Condition: Right toenails are ingrown.      Left foot:      Protective Sensation: 10 sites tested.  8 sites sensed.      Skin integrity: Skin integrity normal.      Toenail Condition: Left toenails are ingrown.      Comments: Diabetic foot exam:   Left: Filament test present   Pulses Dorsalis Pedis:  present   Proprioception normal   Sharp/dull  10/30/2023 3  0 - 999 Hours Final    Sodium 10/30/2023 142  135 - 145 mmol/L Final    Potassium 10/30/2023 4.6  3.4 - 5.1 mmol/L Final    Chloride 10/30/2023 110  97 - 110 mmol/L Final    Carbon Dioxide 10/30/2023 26  21 - 32 mmol/L Final    Anion Gap 10/30/2023 11  7 - 19 mmol/L Final    Glucose 10/30/2023 108 (H)  70 - 99 mg/dL Final    BUN 10/30/2023 20  6 - 20 mg/dL Final    Creatinine 10/30/2023 1.00  0.67 - 1.17 mg/dL Final    Glomerular Filtration Rate 10/30/2023 80  >=60 Final    BUN/Cr 10/30/2023 20  7 - 25 Final    Calcium 10/30/2023 9.1  8.4 - 10.2 mg/dL Final    Bilirubin, Total 10/30/2023 0.3  0.2 - 1.0 mg/dL Final    GOT/AST 10/30/2023 25  <=37 Units/L Final    GPT/ALT 10/30/2023 31  <64 Units/L Final    Alkaline Phosphatase 10/30/2023 143 (H)  45 - 117 Units/L Final    Albumin 10/30/2023 3.7  3.6 - 5.1 g/dL Final    Protein, Total 10/30/2023 6.4  6.4 - 8.2 g/dL Final    Globulin 10/30/2023 2.7  2.0 - 4.0 g/dL Final    A/G Ratio 10/30/2023 1.4  1.0 - 2.4 Final    TSH 10/30/2023 1.104  0.350 - 5.000 mcUnits/mL Final    Hepatitis C Antibody 10/30/2023 Negative  Negative Final       ASSESSMENT AND PLAN:  This 73 year old male presents with :  1. Left foot pain    2. Mild major depression (CMD)    3. Intermittent claudication (CMD)    4. Hypertension goal BP (blood pressure) < 140/90    5. Lumbar degenerative disc disease    6. Acquired hypothyroidism    7. History of Bell's palsy    8. Benign prostatic hyperplasia with urinary hesitancy    9. Left-sided chest pain    10. Grief reaction        Orders Placed This Encounter    XR FOOT 3 OR MORE VIEWS LEFT    Electrocardiogram 12-Lead    tamsulosin (FLOMAX) 0.4 MG Cap       PLAN:    Left foot pain  (primary encounter diagnosis):  Ordered XR FOOT 3 OR MORE VIEWS LEFT  Avoid trimming nails too short.    Mild major depression (CMD):  Stable.  Advised lifestyle modification.  Continue current management.    Intermittent claudication (CMD):  Stable.  Continue  discrimination normal     Right: Filament test present   Pulses Dorsalis Pedis:  present   Proprioception normal   Sharp/dull discrimination normal    Skin:     General: Skin is warm and dry.      Capillary Refill: Capillary refill takes less than 2 seconds.   Neurological:      General: No focal deficit present.      Mental Status: He is alert.        Result Review :  The following data was reviewed by: CHARLENE Thapa on 06/18/2025:  CMP          3/12/2025    07:48   CMP   Glucose 257    BUN 10    Creatinine 0.62    EGFR 133    Sodium 134    Potassium 4.5    Chloride 97    Calcium 9.4    Total Protein 7.1    Albumin 4.7    Globulin 2.4    Total Bilirubin 0.4    Alkaline Phosphatase 89    AST (SGOT) 21    ALT (SGPT) 20    BUN/Creatinine Ratio 16      CBC w/diff          3/12/2025    07:48   CBC w/Diff   WBC 7.0    RBC 5.30    Hemoglobin 15.7    Hematocrit 48.1    MCV 91    MCH 29.6    MCHC 32.6    RDW 12.9    Platelets 231    Neutrophil Rel % 59    Lymphocyte Rel % 33    Monocyte Rel % 5    Eosinophil Rel % 1    Basophil Rel % 1      Lipid Panel          9/15/2024    23:00 3/12/2025    07:48   Lipid Panel   Total Cholesterol 175  158    Triglycerides 434  284    HDL Cholesterol 31  31    VLDL Cholesterol 69  47    LDL Cholesterol  75  80      TSH          3/12/2025    07:48   TSH   TSH 1.550      A1C Last 3 Results          12/11/2024    09:02 3/12/2025    09:06 6/18/2025    08:28   HGBA1C Last 3 Results   Hemoglobin A1C 9.9  10.0  7.7             Assessment and Plan   Diagnoses and all orders for this visit:    1. Encounter for annual physical exam (Primary)    2. Type 2 diabetes mellitus with hyperglycemia, without long-term current use of insulin  -     POC Glycosylated Hemoglobin (Hb A1C)  -     Semaglutide, 2 MG/DOSE, (Ozempic, 2 MG/DOSE,) 8 MG/3ML solution pen-injector; Inject 2 mg under the skin into the appropriate area as directed 1 (One) Time Per Week.  Dispense: 3 mL; Refill: 2    3. Primary  current management.    Hypertension goal BP (blood pressure) < 140/90:  Controlled.  Recommended following a low salt diet.  Continue current medication and management.    Lumbar degenerative disc disease:  Stable.  Continue current medication and management.  Sees spine clinic.    Acquired hypothyroidism:  Stable.  Continue current medication and management.    History of Bell's palsy:  Stable.  Continue current management.    Benign prostatic hyperplasia with urinary hesitancy:  Uncontrolled.  Continue current medication and management.  Keep the appointment with the urologist.    Left-sided chest pain:  Ordered and performed Electrocardiogram 12-Lead, Electrocardiogram 12-Lead. It showed sinus bradycardia with a heart rate of 56 Bpm.  Discussed CT cardiac calcium scoring in detail.    Grief reaction:  Counseled the patient regarding the current situation.    Return in about 2 months (around 5/16/2024), or if symptoms worsen or fail to improve, for medicare wellness visit.    Refer to orders.  Medical compliance with plan discussed and risks of non-compliance reviewed.  Patient education completed on disease process, etiology & prognosis.  Proper usage and side effects of medications reviewed & discussed.  Patient understands and agrees with the plan.  Return to clinic as clinically indicated as discussed with the patient who verbalized understanding of the plan and is in agreement with the plan.    [employer], have created a visit summary document based on the audio recording between Dr. Sabrina Reyna MD and this patient for the physician to review, edit as needed, and authenticate.    Creation Date: 3/16/2024      hypertension    4. Class 2 severe obesity due to excess calories with serious comorbidity and body mass index (BMI) of 39.0 to 39.9 in adult      Health Maintenance Counseling:  --Nutrition: Stressed importance of moderation in sodium/caffeine intake, saturated fat and cholesterol, caloric balance, sufficient intake of fresh fruits, vegetables, fiber, calcium and vit D  --Exercise: Recommended 30 minutes of exercise daily.  --Immunizations:      - Tetanus: Due, but refused.      - Influenza: Recommend yearly.      - Pneumovax: Once after age 50      - Shingrix: Recommend completion of series after 50  --Colonoscopy: Due at 45  --Prostate: Discuss at 56 or on symptoms.    - Diabetes is improving.  Will increase Ozempic to 2 mg weekly and do a prior authorization concerning Jardiance to see if we get this covered.  - Hypertension remains stable.  Continue current medication.  - Obesity has worsened.  Discussed diet and increasing activity.  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           Follow Up   Return in about 3 months (around 9/18/2025) for Recheck diabetes.  Patient was given instructions and counseling regarding his condition or for health maintenance advice. Please see specific information pulled into the AVS if appropriate.     Signed by:    CHARLENE Thapa Date: 06/18/25